# Patient Record
Sex: FEMALE | Race: WHITE | NOT HISPANIC OR LATINO | Employment: OTHER | URBAN - METROPOLITAN AREA
[De-identification: names, ages, dates, MRNs, and addresses within clinical notes are randomized per-mention and may not be internally consistent; named-entity substitution may affect disease eponyms.]

---

## 2017-04-24 ENCOUNTER — HOSPITAL ENCOUNTER (OUTPATIENT)
Dept: OTHER | Facility: HOSPITAL | Age: 77
Discharge: HOME | End: 2017-04-24
Attending: NURSE PRACTITIONER | Admitting: NURSE PRACTITIONER

## 2018-04-27 ENCOUNTER — HOSPITAL ENCOUNTER (OUTPATIENT)
Dept: GENERAL RADIOLOGY | Facility: HOSPITAL | Age: 78
Discharge: HOME | End: 2018-04-27
Attending: NURSE PRACTITIONER | Admitting: NURSE PRACTITIONER

## 2018-05-15 ENCOUNTER — HOSPITAL ENCOUNTER (OUTPATIENT)
Dept: GENERAL RADIOLOGY | Facility: HOSPITAL | Age: 78
Discharge: HOME | End: 2018-05-15
Attending: NURSE PRACTITIONER | Admitting: NURSE PRACTITIONER

## 2019-05-29 ENCOUNTER — HOSPITAL ENCOUNTER (OUTPATIENT)
Dept: OTHER | Facility: HOSPITAL | Age: 79
Discharge: HOME | End: 2019-05-29
Attending: NURSE PRACTITIONER | Admitting: NURSE PRACTITIONER

## 2019-05-29 ENCOUNTER — CONVERSION ENCOUNTER (OUTPATIENT)
Dept: FAMILY MEDICINE CLINIC | Facility: CLINIC | Age: 79
End: 2019-05-29

## 2019-05-29 LAB
ALBUMIN SERPL-MCNC: 4.1 G/DL (ref 3.6–5.1)
ALBUMIN/GLOB SERPL: ABNORMAL {RATIO} (ref 1–2.5)
ALP SERPL-CCNC: 59 UNITS/L (ref 33–130)
ALT SERPL-CCNC: 15 UNITS/L (ref 6–29)
AST SERPL-CCNC: 22 UNITS/L (ref 10–35)
BASOPHILS # BLD AUTO: ABNORMAL 10*3/MM3 (ref 0–200)
BASOPHILS NFR BLD AUTO: 0.9 %
BILIRUB SERPL-MCNC: 0.5 MG/DL (ref 0.2–1.2)
BUN SERPL-MCNC: 19 MG/DL (ref 7–25)
BUN/CREAT SERPL: ABNORMAL (ref 6–22)
CALCIUM SERPL-MCNC: 10.2 MG/DL (ref 8.6–10.4)
CHLORIDE SERPL-SCNC: 103 MMOL/L (ref 98–110)
CO2 CONTENT VENOUS: 30 MMOL/L (ref 20–32)
CONV NEUTROPHILS/100 LEUKOCYTES IN BODY FLUID BY MANUAL COUNT: 58.9 %
CONV TOTAL PROTEIN: 6.6 G/DL (ref 6.1–8.1)
CREAT UR-MCNC: 1.04 MG/DL (ref 0.6–0.88)
EOSINOPHIL # BLD AUTO: 3.7 %
EOSINOPHIL # BLD AUTO: ABNORMAL 10*3/MM3 (ref 15–500)
ERYTHROCYTE [DISTWIDTH] IN BLOOD BY AUTOMATED COUNT: 13.3 % (ref 11–15)
FERRITIN SERPL-MCNC: 93 NG/ML (ref 20–288)
GLOBULIN UR ELPH-MCNC: ABNORMAL G/DL (ref 1.9–3.7)
GLUCOSE SERPL-MCNC: 87 MG/DL (ref 65–99)
HCT VFR BLD AUTO: 45.8 % (ref 35–45)
HGB BLD-MCNC: 15.7 G/DL (ref 11.7–15.5)
IRON SATN MFR SERPL: ABNORMAL % (ref 11–50)
IRON SERPL-MCNC: 136 MCG/DL (ref 45–160)
LYMPHOCYTES # BLD AUTO: ABNORMAL 10*3/MM3 (ref 850–3900)
LYMPHOCYTES NFR BLD AUTO: 28.2 %
MCH RBC QN AUTO: 31.3 PG (ref 27–33)
MCHC RBC AUTO-ENTMCNC: ABNORMAL % (ref 32–36)
MCV RBC AUTO: 91.2 FL (ref 80–100)
MONOCYTES # BLD AUTO: ABNORMAL 10*3/MICROLITER (ref 200–950)
MONOCYTES NFR BLD AUTO: 8.3 %
NEUTROPHILS # BLD AUTO: ABNORMAL 10*3/MM3 (ref 1500–7800)
PLATELET # BLD AUTO: ABNORMAL 10*3/MM3 (ref 140–400)
PMV BLD AUTO: 9.3 FL (ref 7.5–12.5)
POTASSIUM SERPL-SCNC: 4.3 MMOL/L (ref 3.5–5.3)
RBC # BLD AUTO: ABNORMAL 10*6/MM3 (ref 3.8–5.1)
SODIUM SERPL-SCNC: 141 MMOL/L (ref 135–146)
TIBC SERPL-MCNC: ABNORMAL MCG/DL (ref 250–450)
WBC # BLD AUTO: ABNORMAL K/UL (ref 3.8–10.8)

## 2019-06-04 VITALS
WEIGHT: 147 LBS | OXYGEN SATURATION: 94 % | BODY MASS INDEX: 23.63 KG/M2 | RESPIRATION RATE: 16 BRPM | HEIGHT: 66 IN | HEART RATE: 71 BPM | DIASTOLIC BLOOD PRESSURE: 82 MMHG | SYSTOLIC BLOOD PRESSURE: 127 MMHG

## 2019-11-20 ENCOUNTER — OFFICE VISIT (OUTPATIENT)
Dept: FAMILY MEDICINE CLINIC | Facility: CLINIC | Age: 79
End: 2019-11-20

## 2019-11-20 VITALS
DIASTOLIC BLOOD PRESSURE: 77 MMHG | RESPIRATION RATE: 16 BRPM | HEIGHT: 63 IN | OXYGEN SATURATION: 97 % | WEIGHT: 144 LBS | HEART RATE: 70 BPM | TEMPERATURE: 97.7 F | BODY MASS INDEX: 25.52 KG/M2 | SYSTOLIC BLOOD PRESSURE: 129 MMHG

## 2019-11-20 DIAGNOSIS — M76.02 GLUTEAL TENDINITIS OF LEFT BUTTOCK: Primary | ICD-10-CM

## 2019-11-20 DIAGNOSIS — K21.9 GASTROESOPHAGEAL REFLUX DISEASE WITHOUT ESOPHAGITIS: ICD-10-CM

## 2019-11-20 DIAGNOSIS — N18.30 CHRONIC KIDNEY DISEASE, STAGE III (MODERATE) (HCC): ICD-10-CM

## 2019-11-20 PROBLEM — M85.89 OTHER SPECIFIED DISORDERS OF BONE DENSITY AND STRUCTURE, MULTIPLE SITES: Status: ACTIVE | Noted: 2018-05-03

## 2019-11-20 PROBLEM — D75.1 POLYCYTHEMIA: Status: ACTIVE | Noted: 2018-05-03

## 2019-11-20 PROBLEM — R31.9 HEMATURIA, UNSPECIFIED: Status: ACTIVE | Noted: 2017-03-28

## 2019-11-20 PROCEDURE — 99213 OFFICE O/P EST LOW 20 MIN: CPT | Performed by: NURSE PRACTITIONER

## 2019-11-20 RX ORDER — LORATADINE 10 MG/1
TABLET ORAL EVERY 24 HOURS
COMMUNITY
Start: 2017-03-28 | End: 2020-07-02

## 2019-11-20 RX ORDER — RANITIDINE 150 MG/1
150 TABLET ORAL 2 TIMES DAILY
Refills: 3 | COMMUNITY
Start: 2019-10-11 | End: 2019-11-21

## 2019-11-20 RX ORDER — RISEDRONATE SODIUM 150 MG/1
TABLET, FILM COATED ORAL
Refills: 3 | COMMUNITY
Start: 2019-10-14 | End: 2020-07-02 | Stop reason: SDUPTHER

## 2019-11-20 RX ORDER — CHLORAL HYDRATE 500 MG
CAPSULE ORAL
COMMUNITY
Start: 2016-02-25 | End: 2022-08-16

## 2019-11-20 RX ORDER — MELOXICAM 15 MG/1
15 TABLET ORAL DAILY
Qty: 14 TABLET | Refills: 0 | Status: SHIPPED | OUTPATIENT
Start: 2019-11-20 | End: 2020-07-02

## 2019-11-20 NOTE — PROGRESS NOTES
Chief Complaint   Patient presents with   • Hip Pain     Left   • Heartburn      Subjective     Elizabethperlita Evans  has a past medical history of Allergic rhinitis, Chronic kidney disease (CKD), stage III (moderate) (CMS/HCC), Emphysema lung (CMS/HCC), GERD (gastroesophageal reflux disease), Hematuria, Hemochromatosis, Hiatal hernia, Hyperlipidemia, IBS (irritable bowel syndrome), Mitral regurgitation, Multiple nodules of lung, Osteopenia, Paresthesia of both hands, Polycythemia, Recurrent isolated sleep paralysis, Rosacea, Seasonal affective disorder (CMS/HCC), and Sleep apnea.    Hip Pain    The incident occurred more than 1 week ago. There was no injury mechanism. The pain is present in the left hip. The quality of the pain is described as aching. The pain is at a severity of 2/10. The pain is mild. The pain has been fluctuating since onset. Pertinent negatives include no loss of motion, numbness or tingling. She reports no foreign bodies present. The symptoms are aggravated by movement (getting up and down from chair, stepping up/down steps). She has tried nothing for the symptoms.   Heartburn   She reports no abdominal pain, no chest pain, no choking, no coughing, no dysphagia, no heartburn, no nausea, no sore throat, no water brash or no wheezing. This is a chronic problem. The current episode started more than 1 year ago. The problem occurs rarely. The problem has been gradually improving. Pertinent negatives include no fatigue. She has tried a PPI and a histamine-2 antagonist for the symptoms. The treatment provided significant relief.   She has heard about recalls on Zantac and would like to discuss other treatment options.     PHQ-2 Depression Screening  Little interest or pleasure in doing things? 0   Feeling down, depressed, or hopeless? 0   PHQ-2 Total Score 0       No Known Allergies      Current Outpatient Medications:   •  loratadine (CLARITIN) 10 MG tablet, Daily., Disp: , Rfl:   •  Omega-3 Fatty  Acids (FISH OIL) 1000 MG capsule capsule, FISH OIL 1000 MG CAPS, Disp: , Rfl:   •  risedronate (ACTONEL) 150 MG tablet, TAKE 1 TABLET ONCE PER MONTH *TAKE IN THE MORNING WITH 8 OUNCES OF WATER 30 MINUTES BEFORE FOOD, Disp: , Rfl: 3  •  famotidine (PEPCID) 20 MG tablet, Take 1 tablet by mouth 2 (Two) Times a Day., Disp: , Rfl:   •  meloxicam (MOBIC) 15 MG tablet, Take 1 tablet by mouth Daily., Disp: 14 tablet, Rfl: 0    Past Medical History:   Diagnosis Date   • Allergic rhinitis    • Chronic kidney disease (CKD), stage III (moderate) (CMS/HCC)    • Emphysema lung (CMS/HCC)    • GERD (gastroesophageal reflux disease)    • Hematuria    • Hemochromatosis    • Hiatal hernia    • Hyperlipidemia    • IBS (irritable bowel syndrome)    • Mitral regurgitation    • Multiple nodules of lung    • Osteopenia    • Paresthesia of both hands    • Polycythemia    • Recurrent isolated sleep paralysis    • Rosacea    • Seasonal affective disorder (CMS/HCC)    • Sleep apnea      Past Surgical History:   Procedure Laterality Date   • BREAST BIOPSY Right     Benign   • CATARACT EXTRACTION       Social History     Socioeconomic History   • Marital status:      Spouse name: Not on file   • Number of children: Not on file   • Years of education: Not on file   • Highest education level: Not on file   Tobacco Use   • Smoking status: Never Smoker   • Smokeless tobacco: Never Used   Substance and Sexual Activity   • Alcohol use: No     Frequency: Never   • Drug use: No       Family History   Problem Relation Age of Onset   • Heart disease Mother    • Diabetes Mother    • Heart disease Father        Family history, surgical history, past medical history, Allergies and med's reviewed with patient today and updated in [facility] EMR.     ROS:  Review of Systems   Constitutional: Negative for activity change, appetite change, chills, diaphoresis, fatigue and fever.   HENT: Negative for sore throat, trouble swallowing and voice change.    Eyes:  "Negative for redness.   Respiratory: Negative for cough, choking, chest tightness, shortness of breath and wheezing.    Cardiovascular: Negative for chest pain and palpitations.   Gastrointestinal: Negative for abdominal pain, diarrhea, dysphagia, nausea and vomiting.   Musculoskeletal: Negative for back pain.   Skin: Negative for rash.   Neurological: Negative for tingling, weakness and numbness.       OBJECTIVE:  Vitals:    11/20/19 1002   BP: 129/77   BP Location: Left arm   Patient Position: Sitting   Cuff Size: Adult   Pulse: 70   Resp: 16   Temp: 97.7 °F (36.5 °C)   TempSrc: Oral   SpO2: 97%   Weight: 65.3 kg (144 lb)   Height: 160 cm (63\")     Body mass index is 25.51 kg/m².    Physical Exam   Constitutional: She is oriented to person, place, and time. She appears well-developed and well-nourished.   Neck: Trachea normal and normal range of motion. Neck supple. Carotid bruit is not present. No thyromegaly present.   Cardiovascular: Normal rate, regular rhythm, normal heart sounds and intact distal pulses. Exam reveals no gallop and no friction rub.   No murmur heard.  Pulmonary/Chest: Effort normal and breath sounds normal. She has no wheezes. She has no rales.   Abdominal: Soft. Bowel sounds are normal. There is no hepatosplenomegaly. No hernia.   Musculoskeletal: Normal range of motion. She exhibits no edema.        Left hip: She exhibits tenderness. She exhibits normal range of motion, normal strength, no swelling, no crepitus and no deformity.   Mild tenderness at lateral aspect of the left hip and along left gluteus muscle.    Lymphadenopathy:     She has no cervical adenopathy.   Neurological: She is alert and oriented to person, place, and time.   Skin: Skin is warm and dry.   Psychiatric: She has a normal mood and affect. Her behavior is normal. Judgment and thought content normal.       No visits with results within 30 Day(s) from this visit.   Latest known visit with results is:   Conversion " Encounter on 05/29/2019   Component Date Value Ref Range Status   • Albumin 05/29/2019 4.1  3.6 - 5.1 g/dL Final   • Alkaline Phosphatase 05/29/2019 59  33 - 130 units/L Final   • Basophils Absolute 05/29/2019 63 CELLS/UL  0 - 200 10*3/mm3 Final   • Basophil Rel % 05/29/2019 0.9  % Final   • Total Bilirubin 05/29/2019 0.5  0.2 - 1.2 mg/dL Final   • BUN 05/29/2019 19  7 - 25 mg/dL Final   • BUN/Creatinine Ratio 05/29/2019 18 (calc)  6 - 22 Final   • Calcium 05/29/2019 10.2  8.6 - 10.4 mg/dL Final   • Chloride 05/29/2019 103  98 - 110 mmol/L Final   • CO2 CONTENT  05/29/2019 30  20 - 32 mmol/L Final   • Creatinine 05/29/2019 1.04* 0.60 - 0.88 mg/dL Final   • eGFR African Am 05/29/2019 51* > OR = 60 mL/min/1.73m2 Final   • eGFR Non African Am 05/29/2019 59* > OR = 60 mL/min/1.73m2 Final   • Eosinophils Absolute 05/29/2019 259 CELLS/UL  15 - 500 10*3/mm3 Final   • Eosinophil Rel % 05/29/2019 3.7  % Final   • Ferritin 05/29/2019 93  20 - 288 ng/mL Final   • Globulin 05/29/2019 2.5 G/DL (CALC)  1.9 - 3.7 g/dL Final   • Glucose 05/29/2019 87  65 - 99 mg/dL Final   • Hematocrit 05/29/2019 45.8* 35.0 - 45.0 % Final   • Hemoglobin 05/29/2019 15.7* 11.7 - 15.5 g/dL Final   • Iron 05/29/2019 136  45 - 160 mcg/dL Final   • Iron Saturation 05/29/2019 44 % (CALC)  11 - 50 % Final   • TIBC 05/29/2019 306 MCG/DL (CALC)  250 - 450 mcg/dL Final   • Lymphocytes Absolute 05/29/2019 1974 CELLS/UL  850 - 3900 10*3/mm3 Final   • Lymphocyte Rel % 05/29/2019 28.2  % Final   • MCH 05/29/2019 31.3  27.0 - 33.0 pg Final   • MCHC 05/29/2019 34.3 G/DL  32.0 - 36.0 % Final   • MCV 05/29/2019 91.2  80.0 - 100.0 fL Final   • Monocyte Rel % 05/29/2019 8.3  % Final   • Monocytes Absolute 05/29/2019 581 CELLS/UL  200 - 950 10*3/microliter Final   • MPV 05/29/2019 9.3  7.5 - 12.5 fL Final   • Neutrophils Absolute 05/29/2019 4123 CELLS/UL  1500 - 7800 10*3/mm3 Final   • Platelets 05/29/2019 202 THOUSAND/UL  140 - 400 10*3/mm3 Final   • Neutrophils, Fluid  05/29/2019 58.9  % Final   • Potassium 05/29/2019 4.3  3.5 - 5.3 mmol/L Final   • Total Protein 05/29/2019 6.6  6.1 - 8.1 g/dL Final   • RBC 05/29/2019 5.02 MILLION/UL  3.80 - 5.10 10*6/mm3 Final   • RDW 05/29/2019 13.3  11.0 - 15.0 % Final   • AST (SGOT) 05/29/2019 22  10 - 35 units/L Final   • ALT (SGPT) 05/29/2019 15  6 - 29 units/L Final   • Sodium 05/29/2019 141  135 - 146 mmol/L Final   • WBC 05/29/2019 7.0 THOUSAND/UL  3.8 - 10.8 K/uL Final   • A/G Ratio 05/29/2019 1.6 (calc)  1.0 - 2.5 Final       ASSESSMENT/ PLAN:    Diagnoses and all orders for this visit:    1. Gluteal tendinitis of left buttock (Primary)  Comments:  Probable cause of her pain. Trial of NSAID.   f/up in 1 mo. If no better will get x-ray.     2. Chronic kidney disease, stage III (moderate) (CMS/HCC)  Assessment & Plan:  Discussed risks of taking NSAID. She is only going to take it for 2 weeks.     Orders:  -     CBC & Differential  -     Comprehensive Metabolic Panel    3. Gastroesophageal reflux disease without esophagitis  Assessment & Plan:  She is going to try switching to OTC Pepcid.   She will let me know if this is not effective.       Other orders  -     meloxicam (MOBIC) 15 MG tablet; Take 1 tablet by mouth Daily.  Dispense: 14 tablet; Refill: 0  -     famotidine (PEPCID) 20 MG tablet; Take 1 tablet by mouth 2 (Two) Times a Day.      Orders Placed Today:     New Medications Ordered This Visit   Medications   • meloxicam (MOBIC) 15 MG tablet     Sig: Take 1 tablet by mouth Daily.     Dispense:  14 tablet     Refill:  0   • famotidine (PEPCID) 20 MG tablet     Sig: Take 1 tablet by mouth 2 (Two) Times a Day.        Management Plan:     An After Visit Summary was printed and given to the patient at discharge.    Follow-up: Return in about 1 month (around 12/20/2019) for Recheck hip pain.    Beth Rosales, APRN 11/21/2019 3:57 PM  This note was electronically signed.

## 2019-11-20 NOTE — PATIENT INSTRUCTIONS
Pepcid (Famotidine) tablets or gelcaps  What is this medicine?  FAMOTIDINE (fa AFTAB ti pawan) is a type of antihistamine that blocks the release of stomach acid. It is used to treat stomach or intestinal ulcers. It can also relieve heartburn from acid reflux.  This medicine may be used for other purposes; ask your health care provider or pharmacist if you have questions.  COMMON BRAND NAME(S): Heartburn Relief, Pepcid, Pepcid AC, Pepcid AC Maximum Strength  What should I tell my health care provider before I take this medicine?  They need to know if you have any of these conditions:  -kidney or liver disease  -trouble swallowing  -an unusual or allergic reaction to famotidine, other medicines, foods, dyes, or preservatives  -pregnant or trying to get pregnant  -breast-feeding  How should I use this medicine?  Take this medicine by mouth with a glass of water. Follow the directions on the prescription label. If you only take this medicine once a day, take it at bedtime. Take your doses at regular intervals. Do not take your medicine more often than directed.  Talk to your pediatrician regarding the use of this medicine in children. Special care may be needed.  Overdosage: If you think you have taken too much of this medicine contact a poison control center or emergency room at once.  NOTE: This medicine is only for you. Do not share this medicine with others.  What if I miss a dose?  If you miss a dose, take it as soon as you can. If it is almost time for your next dose, take only that dose. Do not take double or extra doses.  What may interact with this medicine?  -delavirdine  -itraconazole  -ketoconazole  This list may not describe all possible interactions. Give your health care provider a list of all the medicines, herbs, non-prescription drugs, or dietary supplements you use. Also tell them if you smoke, drink alcohol, or use illegal drugs. Some items may interact with your medicine.  What should I watch for  while using this medicine?  Tell your doctor or health care professional if your condition does not start to get better or if it gets worse. Finish the full course of tablets prescribed, even if you feel better.  Do not take with aspirin, ibuprofen or other antiinflammatory medicines. These can make your condition worse.  Do not smoke cigarettes or drink alcohol. These cause irritation in your stomach and can increase the time it will take for ulcers to heal.  If you get black, tarry stools or vomit up what looks like coffee grounds, call your doctor or health care professional at once. You may have a bleeding ulcer.  This medicine may cause a decrease in vitamin B12. You should make sure that you get enough vitamin B12 while you are taking this medicine. Discuss the foods you eat and the vitamins you take with your health care professional.  What side effects may I notice from receiving this medicine?  Side effects that you should report to your doctor or health care professional as soon as possible:  -agitation, nervousness  -confusion  -hallucinations  -skin rash, itching  Side effects that usually do not require medical attention (report to your doctor or health care professional if they continue or are bothersome):  -constipation  -diarrhea  -dizziness  -headache  This list may not describe all possible side effects. Call your doctor for medical advice about side effects. You may report side effects to FDA at 1-809-FDA-6769.  Where should I keep my medicine?  Keep out of the reach of children.  Store at room temperature between 15 and 30 degrees C (59 and 86 degrees F). Do not freeze. Throw away any unused medicine after the expiration date.  NOTE: This sheet is a summary. It may not cover all possible information. If you have questions about this medicine, talk to your doctor, pharmacist, or health care provider.  © 2019 [employer]/Gold Standard (2018-08-03 13:17:50)

## 2019-11-21 RX ORDER — FAMOTIDINE 20 MG/1
20 TABLET, FILM COATED ORAL 2 TIMES DAILY
Start: 2019-11-21 | End: 2021-09-09

## 2019-11-23 LAB
ALBUMIN SERPL-MCNC: 4.2 G/DL (ref 3.5–4.7)
ALBUMIN/GLOB SERPL: 1.8 {RATIO} (ref 1.2–2.2)
ALP SERPL-CCNC: 58 IU/L (ref 39–117)
ALT SERPL-CCNC: 15 IU/L (ref 0–32)
AST SERPL-CCNC: 25 IU/L (ref 0–40)
BASOPHILS # BLD AUTO: 0.1 X10E3/UL (ref 0–0.2)
BASOPHILS NFR BLD AUTO: 1 %
BILIRUB SERPL-MCNC: 0.4 MG/DL (ref 0–1.2)
BUN SERPL-MCNC: 12 MG/DL (ref 8–27)
BUN/CREAT SERPL: 13 (ref 12–28)
CALCIUM SERPL-MCNC: 9.3 MG/DL (ref 8.7–10.3)
CHLORIDE SERPL-SCNC: 104 MMOL/L (ref 96–106)
CO2 SERPL-SCNC: 25 MMOL/L (ref 20–29)
CREAT SERPL-MCNC: 0.93 MG/DL (ref 0.57–1)
EOSINOPHIL # BLD AUTO: 0.3 X10E3/UL (ref 0–0.4)
EOSINOPHIL NFR BLD AUTO: 6 %
ERYTHROCYTE [DISTWIDTH] IN BLOOD BY AUTOMATED COUNT: 13.8 % (ref 12.3–15.4)
GLOBULIN SER CALC-MCNC: 2.4 G/DL (ref 1.5–4.5)
GLUCOSE SERPL-MCNC: 86 MG/DL (ref 65–99)
HCT VFR BLD AUTO: 46.3 % (ref 34–46.6)
HGB BLD-MCNC: 15.3 G/DL (ref 11.1–15.9)
IMM GRANULOCYTES # BLD AUTO: 0 X10E3/UL (ref 0–0.1)
IMM GRANULOCYTES NFR BLD AUTO: 0 %
LYMPHOCYTES # BLD AUTO: 1.9 X10E3/UL (ref 0.7–3.1)
LYMPHOCYTES NFR BLD AUTO: 34 %
MCH RBC QN AUTO: 30.8 PG (ref 26.6–33)
MCHC RBC AUTO-ENTMCNC: 33 G/DL (ref 31.5–35.7)
MCV RBC AUTO: 93 FL (ref 79–97)
MONOCYTES # BLD AUTO: 0.5 X10E3/UL (ref 0.1–0.9)
MONOCYTES NFR BLD AUTO: 9 %
NEUTROPHILS # BLD AUTO: 2.8 X10E3/UL (ref 1.4–7)
NEUTROPHILS NFR BLD AUTO: 50 %
PLATELET # BLD AUTO: 215 X10E3/UL (ref 150–450)
POTASSIUM SERPL-SCNC: 4.7 MMOL/L (ref 3.5–5.2)
PROT SERPL-MCNC: 6.6 G/DL (ref 6–8.5)
RBC # BLD AUTO: 4.96 X10E6/UL (ref 3.77–5.28)
SODIUM SERPL-SCNC: 143 MMOL/L (ref 134–144)
WBC # BLD AUTO: 5.5 X10E3/UL (ref 3.4–10.8)

## 2019-12-26 ENCOUNTER — OFFICE VISIT (OUTPATIENT)
Dept: FAMILY MEDICINE CLINIC | Facility: CLINIC | Age: 79
End: 2019-12-26

## 2019-12-26 VITALS
RESPIRATION RATE: 18 BRPM | WEIGHT: 145.4 LBS | HEART RATE: 87 BPM | HEIGHT: 63 IN | SYSTOLIC BLOOD PRESSURE: 125 MMHG | BODY MASS INDEX: 25.76 KG/M2 | TEMPERATURE: 97.8 F | DIASTOLIC BLOOD PRESSURE: 68 MMHG | OXYGEN SATURATION: 96 %

## 2019-12-26 DIAGNOSIS — N18.30 CHRONIC KIDNEY DISEASE, STAGE III (MODERATE) (HCC): ICD-10-CM

## 2019-12-26 DIAGNOSIS — K21.9 GASTROESOPHAGEAL REFLUX DISEASE WITHOUT ESOPHAGITIS: ICD-10-CM

## 2019-12-26 DIAGNOSIS — M76.02 GLUTEAL TENDINITIS OF LEFT BUTTOCK: Primary | ICD-10-CM

## 2019-12-26 PROCEDURE — 99213 OFFICE O/P EST LOW 20 MIN: CPT | Performed by: NURSE PRACTITIONER

## 2019-12-26 NOTE — PROGRESS NOTES
Chief Complaint   Patient presents with   • Hip Pain     f/u left hip, better        Subjective     Elizabethperlita Evans  has a past medical history of Allergic rhinitis, Chronic kidney disease (CKD), stage III (moderate) (CMS/HCC), Emphysema lung (CMS/HCC), GERD (gastroesophageal reflux disease), Hematuria, Hemochromatosis, Hiatal hernia, Hyperlipidemia, IBS (irritable bowel syndrome), Mitral regurgitation, Multiple nodules of lung, Osteopenia, Paresthesia of both hands, Polycythemia, Recurrent isolated sleep paralysis, Rosacea, Seasonal affective disorder (CMS/HCC), and Sleep apnea.    Hip Pain    The incident occurred more than 1 week ago. There was no injury mechanism. The pain is present in the left hip. The quality of the pain is described as aching. The patient is experiencing no pain. The pain has been improving since onset. Pertinent negatives include no loss of motion, numbness or tingling. She reports no foreign bodies present. The symptoms are aggravated by movement (getting up and down from chair, stepping up/down steps). She has tried NSAIDs for the symptoms. The treatment provided significant relief.   Heartburn   She reports no abdominal pain, no chest pain, no choking, no coughing, no dysphagia, no heartburn, no nausea, no sore throat, no water brash or no wheezing. This is a chronic problem. The current episode started more than 1 year ago. The problem occurs rarely. The problem has been gradually improving. Pertinent negatives include no fatigue. She has tried a PPI and a histamine-2 antagonist for the symptoms. The treatment provided significant relief.   Chronic Kidney Disease   This is a new problem. The current episode started more than 1 month ago. Pertinent negatives include no abdominal pain, chest pain, chills, coughing, diaphoresis, fatigue, fever, nausea, numbness, rash, sore throat, vomiting or weakness. Nothing aggravates the symptoms.   She used Mobic sparingly and her hip pain has  resolved. She has been taking OTC Pepcid and it is helping her reflux symptoms.     No Known Allergies      Current Outpatient Medications:   •  famotidine (PEPCID) 20 MG tablet, Take 1 tablet by mouth 2 (Two) Times a Day., Disp: , Rfl:   •  loratadine (CLARITIN) 10 MG tablet, Daily., Disp: , Rfl:   •  meloxicam (MOBIC) 15 MG tablet, Take 1 tablet by mouth Daily., Disp: 14 tablet, Rfl: 0  •  Omega-3 Fatty Acids (FISH OIL) 1000 MG capsule capsule, FISH OIL 1000 MG CAPS, Disp: , Rfl:   •  risedronate (ACTONEL) 150 MG tablet, TAKE 1 TABLET ONCE PER MONTH *TAKE IN THE MORNING WITH 8 OUNCES OF WATER 30 MINUTES BEFORE FOOD, Disp: , Rfl: 3    Past Medical History:   Diagnosis Date   • Allergic rhinitis    • Chronic kidney disease (CKD), stage III (moderate) (CMS/HCC)    • Emphysema lung (CMS/HCC)    • GERD (gastroesophageal reflux disease)    • Hematuria    • Hemochromatosis    • Hiatal hernia    • Hyperlipidemia    • IBS (irritable bowel syndrome)    • Mitral regurgitation    • Multiple nodules of lung    • Osteopenia    • Paresthesia of both hands    • Polycythemia    • Recurrent isolated sleep paralysis    • Rosacea    • Seasonal affective disorder (CMS/HCC)    • Sleep apnea        Past Surgical History:   Procedure Laterality Date   • BREAST BIOPSY Right     Benign   • CATARACT EXTRACTION         Social History     Socioeconomic History   • Marital status:      Spouse name: Not on file   • Number of children: Not on file   • Years of education: Not on file   • Highest education level: Not on file   Tobacco Use   • Smoking status: Never Smoker   • Smokeless tobacco: Never Used   Substance and Sexual Activity   • Alcohol use: No     Frequency: Never   • Drug use: No       Family History   Problem Relation Age of Onset   • Heart disease Mother    • Diabetes Mother    • Heart disease Father        Family history, surgical history, past medical history, Allergies and med's reviewed with patient today and updated in  "[facility] EMR.     ROS:  Review of Systems   Constitutional: Negative for activity change, appetite change, chills, diaphoresis, fatigue and fever.   HENT: Negative for sore throat, trouble swallowing and voice change.    Eyes: Negative for redness.   Respiratory: Negative for cough, choking, chest tightness, shortness of breath and wheezing.    Cardiovascular: Negative for chest pain and palpitations.   Gastrointestinal: Negative for abdominal pain, diarrhea, dysphagia, nausea and vomiting.   Musculoskeletal: Negative for back pain.   Skin: Negative for rash.   Neurological: Negative for tingling, weakness and numbness.       OBJECTIVE:  Vitals:    12/26/19 1309   BP: 125/68   BP Location: Left arm   Patient Position: Sitting   Cuff Size: Adult   Pulse: 87   Resp: 18   Temp: 97.8 °F (36.6 °C)   TempSrc: Oral   SpO2: 96%   Weight: 66 kg (145 lb 6.4 oz)   Height: 160 cm (63\")     Body mass index is 25.76 kg/m².    Physical Exam   Constitutional: She is oriented to person, place, and time. She appears well-developed and well-nourished.   Neck: Trachea normal and normal range of motion. Neck supple. Carotid bruit is not present. No thyromegaly present.   Cardiovascular: Normal rate, regular rhythm, normal heart sounds and intact distal pulses. Exam reveals no gallop and no friction rub.   No murmur heard.  Pulmonary/Chest: Effort normal and breath sounds normal. She has no wheezes. She has no rales.   Abdominal: Soft. Bowel sounds are normal. There is no hepatosplenomegaly. No hernia.   Musculoskeletal: Normal range of motion. She exhibits no edema.        Left hip: She exhibits normal range of motion, normal strength, no tenderness, no swelling, no crepitus and no deformity.   No tenderness at lateral aspect of the left hip and along left gluteus muscle.    Lymphadenopathy:     She has no cervical adenopathy.   Neurological: She is alert and oriented to person, place, and time.   Skin: Skin is warm and dry.   Psychiatric: " She has a normal mood and affect. Her behavior is normal. Judgment and thought content normal.       No visits with results within 30 Day(s) from this visit.   Latest known visit with results is:   Office Visit on 11/20/2019   Component Date Value Ref Range Status   • WBC 11/22/2019 5.5  3.4 - 10.8 x10E3/uL Final   • RBC 11/22/2019 4.96  3.77 - 5.28 x10E6/uL Final   • Hemoglobin 11/22/2019 15.3  11.1 - 15.9 g/dL Final   • Hematocrit 11/22/2019 46.3  34.0 - 46.6 % Final   • MCV 11/22/2019 93  79 - 97 fL Final   • MCH 11/22/2019 30.8  26.6 - 33.0 pg Final   • MCHC 11/22/2019 33.0  31.5 - 35.7 g/dL Final   • RDW 11/22/2019 13.8  12.3 - 15.4 % Final   • Platelets 11/22/2019 215  150 - 450 x10E3/uL Final   • Neutrophil Rel % 11/22/2019 50  Not Estab. % Final   • Lymphocyte Rel % 11/22/2019 34  Not Estab. % Final   • Monocyte Rel % 11/22/2019 9  Not Estab. % Final   • Eosinophil Rel % 11/22/2019 6  Not Estab. % Final   • Basophil Rel % 11/22/2019 1  Not Estab. % Final   • Neutrophils Absolute 11/22/2019 2.8  1.4 - 7.0 x10E3/uL Final   • Lymphocytes Absolute 11/22/2019 1.9  0.7 - 3.1 x10E3/uL Final   • Monocytes Absolute 11/22/2019 0.5  0.1 - 0.9 x10E3/uL Final   • Eosinophils Absolute 11/22/2019 0.3  0.0 - 0.4 x10E3/uL Final   • Basophils Absolute 11/22/2019 0.1  0.0 - 0.2 x10E3/uL Final   • Immature Granulocyte Rel % 11/22/2019 0  Not Estab. % Final   • Immature Grans Absolute 11/22/2019 0.0  0.0 - 0.1 x10E3/uL Final   • Glucose 11/22/2019 86  65 - 99 mg/dL Final   • BUN 11/22/2019 12  8 - 27 mg/dL Final   • Creatinine 11/22/2019 0.93  0.57 - 1.00 mg/dL Final   • eGFR Non African Am 11/22/2019 58* >59 mL/min/1.73 Final   • eGFR African Am 11/22/2019 67  >59 mL/min/1.73 Final   • BUN/Creatinine Ratio 11/22/2019 13  12 - 28 Final   • Sodium 11/22/2019 143  134 - 144 mmol/L Final   • Potassium 11/22/2019 4.7  3.5 - 5.2 mmol/L Final   • Chloride 11/22/2019 104  96 - 106 mmol/L Final   • Total CO2 11/22/2019 25  20 - 29  mmol/L Final   • Calcium 11/22/2019 9.3  8.7 - 10.3 mg/dL Final   • Total Protein 11/22/2019 6.6  6.0 - 8.5 g/dL Final   • Albumin 11/22/2019 4.2  3.5 - 4.7 g/dL Final   • Globulin 11/22/2019 2.4  1.5 - 4.5 g/dL Final   • A/G Ratio 11/22/2019 1.8  1.2 - 2.2 Final   • Total Bilirubin 11/22/2019 0.4  0.0 - 1.2 mg/dL Final   • Alkaline Phosphatase 11/22/2019 58  39 - 117 IU/L Final   • AST (SGOT) 11/22/2019 25  0 - 40 IU/L Final   • ALT (SGPT) 11/22/2019 15  0 - 32 IU/L Final       ASSESSMENT/ PLAN:    Diagnoses and all orders for this visit:    1. Gluteal tendinitis of left buttock (Primary)  Comments:  Pain has resolved.  Try to avoid use of Mobic, if used use very sparingly.    2. Chronic kidney disease, stage III (moderate) (CMS/HCC)  Assessment & Plan:  Reviewed recent lab results.  Stay well-hydrated.  Avoid NSAIDs as much as possible.  Again reiterated to use Mobic very sparingly.      3. Gastroesophageal reflux disease without esophagitis  Assessment & Plan:  Doing well on OTC Pepcid.        Orders Placed Today:     No orders of the defined types were placed in this encounter.       Management Plan:     An After Visit Summary was printed and given to the patient at discharge.    Follow-up: Return in about 5 months (around 6/3/2020) for Medicare Wellness.    JESSICA Hall 12/27/2019 1:21 PM  This note was electronically signed.

## 2019-12-27 NOTE — ASSESSMENT & PLAN NOTE
Reviewed recent lab results.  Stay well-hydrated.  Avoid NSAIDs as much as possible.  Again reiterated to use Mobic very sparingly.

## 2020-03-30 ENCOUNTER — TELEPHONE (OUTPATIENT)
Dept: FAMILY MEDICINE CLINIC | Facility: CLINIC | Age: 80
End: 2020-03-30

## 2020-03-30 PROBLEM — R93.3 ABNORMAL CT SCAN, ESOPHAGUS: Status: ACTIVE | Noted: 2020-03-30

## 2020-05-04 ENCOUNTER — TELEPHONE (OUTPATIENT)
Dept: FAMILY MEDICINE CLINIC | Facility: CLINIC | Age: 80
End: 2020-05-04

## 2020-05-04 DIAGNOSIS — Z12.31 BREAST CANCER SCREENING BY MAMMOGRAM: Primary | ICD-10-CM

## 2020-05-04 NOTE — TELEPHONE ENCOUNTER
Patient received a letter that she is due for her mammogram. Wants you to order so she can schedule.

## 2020-06-01 ENCOUNTER — HOSPITAL ENCOUNTER (OUTPATIENT)
Dept: MAMMOGRAPHY | Facility: HOSPITAL | Age: 80
Discharge: HOME | End: 2020-06-01
Admitting: NURSE PRACTITIONER

## 2020-06-01 DIAGNOSIS — Z12.31 BREAST CANCER SCREENING BY MAMMOGRAM: ICD-10-CM

## 2020-06-01 PROCEDURE — 77063 BREAST TOMOSYNTHESIS BI: CPT

## 2020-06-01 PROCEDURE — 77067 SCR MAMMO BI INCL CAD: CPT

## 2020-07-02 ENCOUNTER — OFFICE VISIT (OUTPATIENT)
Dept: FAMILY MEDICINE CLINIC | Facility: CLINIC | Age: 80
End: 2020-07-02

## 2020-07-02 VITALS
WEIGHT: 143 LBS | BODY MASS INDEX: 25.34 KG/M2 | SYSTOLIC BLOOD PRESSURE: 122 MMHG | OXYGEN SATURATION: 94 % | HEIGHT: 63 IN | RESPIRATION RATE: 16 BRPM | HEART RATE: 80 BPM | TEMPERATURE: 97.3 F | DIASTOLIC BLOOD PRESSURE: 73 MMHG

## 2020-07-02 DIAGNOSIS — E83.119 HEMOCHROMATOSIS, UNSPECIFIED HEMOCHROMATOSIS TYPE: ICD-10-CM

## 2020-07-02 DIAGNOSIS — G47.53 RECURRENT ISOLATED SLEEP PARALYSIS: ICD-10-CM

## 2020-07-02 DIAGNOSIS — H61.23 BILATERAL IMPACTED CERUMEN: ICD-10-CM

## 2020-07-02 DIAGNOSIS — Z12.11 SCREENING FOR COLON CANCER: ICD-10-CM

## 2020-07-02 DIAGNOSIS — N18.30 CHRONIC KIDNEY DISEASE, STAGE III (MODERATE) (HCC): ICD-10-CM

## 2020-07-02 DIAGNOSIS — B00.9 HERPETIC LESIONS: ICD-10-CM

## 2020-07-02 DIAGNOSIS — G47.33 OBSTRUCTIVE SLEEP APNEA SYNDROME: ICD-10-CM

## 2020-07-02 DIAGNOSIS — N95.2 ATROPHIC VAGINITIS: ICD-10-CM

## 2020-07-02 DIAGNOSIS — N81.10 FEMALE CYSTOCELE: ICD-10-CM

## 2020-07-02 DIAGNOSIS — F33.8 SEASONAL AFFECTIVE DISORDER (HCC): ICD-10-CM

## 2020-07-02 DIAGNOSIS — E78.2 MIXED HYPERLIPIDEMIA: ICD-10-CM

## 2020-07-02 DIAGNOSIS — R91.8 MULTIPLE PULMONARY NODULES: ICD-10-CM

## 2020-07-02 DIAGNOSIS — K58.1 IRRITABLE BOWEL SYNDROME WITH CONSTIPATION: ICD-10-CM

## 2020-07-02 DIAGNOSIS — K44.9 HIATAL HERNIA: ICD-10-CM

## 2020-07-02 DIAGNOSIS — J43.9 PULMONARY EMPHYSEMA, UNSPECIFIED EMPHYSEMA TYPE (HCC): ICD-10-CM

## 2020-07-02 DIAGNOSIS — R31.21 ASYMPTOMATIC MICROSCOPIC HEMATURIA: ICD-10-CM

## 2020-07-02 DIAGNOSIS — M85.89 OSTEOPENIA OF MULTIPLE SITES: ICD-10-CM

## 2020-07-02 DIAGNOSIS — J30.9 ALLERGIC RHINITIS, UNSPECIFIED SEASONALITY, UNSPECIFIED TRIGGER: ICD-10-CM

## 2020-07-02 DIAGNOSIS — K22.9 ESOPHAGEAL ABNORMALITY: ICD-10-CM

## 2020-07-02 DIAGNOSIS — Z00.01 ENCOUNTER FOR GENERAL ADULT MEDICAL EXAMINATION WITH ABNORMAL FINDINGS: Primary | ICD-10-CM

## 2020-07-02 DIAGNOSIS — I34.0 MITRAL VALVE INSUFFICIENCY, UNSPECIFIED ETIOLOGY: ICD-10-CM

## 2020-07-02 DIAGNOSIS — K21.9 GASTROESOPHAGEAL REFLUX DISEASE WITHOUT ESOPHAGITIS: ICD-10-CM

## 2020-07-02 PROBLEM — H04.129 DRY EYE SYNDROME: Status: ACTIVE | Noted: 2020-07-02

## 2020-07-02 LAB
BILIRUB BLD-MCNC: NEGATIVE MG/DL
CLARITY, POC: CLEAR
COLOR UR: YELLOW
GLUCOSE UR STRIP-MCNC: NEGATIVE MG/DL
KETONES UR QL: NEGATIVE
LEUKOCYTE EST, POC: ABNORMAL
NITRITE UR-MCNC: NEGATIVE MG/ML
PH UR: 5.5 [PH] (ref 5–8)
PROT UR STRIP-MCNC: NEGATIVE MG/DL
RBC # UR STRIP: ABNORMAL /UL
SP GR UR: 1.02 (ref 1–1.03)
UROBILINOGEN UR QL: NORMAL

## 2020-07-02 PROCEDURE — 81003 URINALYSIS AUTO W/O SCOPE: CPT | Performed by: NURSE PRACTITIONER

## 2020-07-02 PROCEDURE — G0439 PPPS, SUBSEQ VISIT: HCPCS | Performed by: NURSE PRACTITIONER

## 2020-07-02 PROCEDURE — 99214 OFFICE O/P EST MOD 30 MIN: CPT | Performed by: NURSE PRACTITIONER

## 2020-07-02 RX ORDER — RISEDRONATE SODIUM 150 MG/1
150 TABLET, FILM COATED ORAL
Qty: 4 TABLET | Refills: 3 | Status: SHIPPED | OUTPATIENT
Start: 2020-07-02 | End: 2021-07-28 | Stop reason: SDUPTHER

## 2020-07-02 RX ORDER — DOCUSATE SODIUM 100 MG/1
CAPSULE, LIQUID FILLED ORAL
COMMUNITY
Start: 2020-03-29 | End: 2021-09-09

## 2020-07-02 RX ORDER — POLYETHYLENE GLYCOL 3350 17 G/17G
17 POWDER, FOR SOLUTION ORAL DAILY
COMMUNITY

## 2020-07-02 RX ORDER — CETIRIZINE HYDROCHLORIDE 10 MG/1
10 TABLET ORAL AS NEEDED
COMMUNITY

## 2020-07-02 RX ORDER — DICYCLOMINE HCL 20 MG
TABLET ORAL
COMMUNITY
Start: 2020-03-29 | End: 2020-07-02

## 2020-07-02 NOTE — PROGRESS NOTES
Medicare Annual Wellness Visit  Chief Complaint   Patient presents with   • Medicare Wellness-subsequent   • Hyperlipidemia   • Chronic Kidney Disease       Subjective     Elizabeth Evans is a 79 y.o. female who presents for an Annual Wellness Visit. In addition, we addressed the following health issues:    Hyperlipidemia   This is a chronic problem. The current episode started more than 1 year ago. The problem is uncontrolled. Recent lipid tests were reviewed and are high. Pertinent negatives include no chest pain, myalgias or shortness of breath. Current antihyperlipidemic treatment includes diet change and exercise.   Heartburn   She complains of heartburn. She reports no abdominal pain, no chest pain, no choking, no coughing, no dysphagia, no globus sensation, no nausea, no sore throat, no water brash or no wheezing. This is a chronic problem. The current episode started more than 1 year ago. The problem occurs frequently. The problem has been waxing and waning. The heartburn duration is several minutes. The heartburn is located in the substernum. The heartburn is of mild intensity. The heartburn does not wake her from sleep. The heartburn does not limit her activity. The heartburn doesn't change with position. Nothing aggravates the symptoms. Associated symptoms include fatigue. Pertinent negatives include no weight loss. She has tried a PPI and a histamine-2 antagonist (Nexium and Zantac were more effective than Pepcid) for the symptoms. The treatment provided mild relief.   Chronic Kidney Disease   This is a chronic problem. The current episode started more than 1 year ago. The problem occurs constantly. The problem has been unchanged. Associated symptoms include arthralgias and fatigue. Pertinent negatives include no abdominal pain, chest pain, chills, congestion, coughing, diaphoresis, fever, joint swelling, myalgias, nausea, neck pain, rash, sore throat, swollen glands, vomiting or weakness. Nothing  aggravates the symptoms.   Allergies  The patient also presents with allergies.  The onset of the problem began >1 year ago.   She denies nasal obstruction, nasal stuffiness, purulent nasal discharge, runny nose, post nasal drip, itchy watery eyes, sneezing and cough.  The patient notes it is exacerbated by seasonal changes.  The patient denies headache, facial pain, blocked ears, fatigue, urticaria, hoarseness, sinus infection and loss of smell.  Prior effective therapies include antihistamines.    Medications    Current Outpatient Medications:   •  cetirizine (zyrTEC) 10 MG tablet, Take 10 mg by mouth Daily., Disp: , Rfl:   •  docusate sodium (COLACE) 100 MG capsule, TAKE 1 CAPSULE BY MOUTH TWICE A DAY AS NEEDED FOR CONSTIPATION, Disp: , Rfl:   •  famotidine (PEPCID) 20 MG tablet, Take 1 tablet by mouth 2 (Two) Times a Day., Disp: , Rfl:   •  Omega-3 Fatty Acids (FISH OIL) 1000 MG capsule capsule, FISH OIL 1000 MG CAPS, Disp: , Rfl:   •  polyethylene glycol (MIRALAX) 17 GM/SCOOP powder, Take 17 g by mouth Daily., Disp: , Rfl:   •  risedronate (ACTONEL) 150 MG tablet, Take 1 tablet by mouth Every 30 (Thirty) Days. with water on empty stomach, nothing by mouth or lie down for next 30 minutes., Disp: 4 tablet, Rfl: 3     Problem List  Patient Active Problem List   Diagnosis   • Allergic rhinitis   • Chronic kidney disease, stage III (moderate) (CMS/HCC)   • Gastroesophageal reflux disease   • Hand paresthesia   • Hearing loss   • Hematuria, unspecified   • Hemochromatosis   • Hiatal hernia   • Hyperlipidemia   • Irritable bowel syndrome   • Mitral regurgitation   • Multiple pulmonary nodules   • Osteopenia of multiple sites   • Pulmonary emphysema (CMS/HCC)   • Recurrent isolated sleep paralysis   • Rosacea   • Seasonal affective disorder (CMS/HCC)   • Obstructive sleep apnea syndrome   • Abnormal CT scan, esophagus   • Herpetic lesions   • History of ehrlichiosis   • Atrophic vaginitis   • Dry eye syndrome   •  Female cystocele   • Esophageal abnormality       Surgical History  Past Surgical History:   Procedure Laterality Date   • BREAST BIOPSY Right     Benign   • CATARACT EXTRACTION          Social History  Social History     Socioeconomic History   • Marital status:      Spouse name: Not on file   • Number of children: Not on file   • Years of education: Not on file   • Highest education level: Not on file   Occupational History   • Occupation: Retired Teacher     Comment: [employer]   Tobacco Use   • Smoking status: Never Smoker   • Smokeless tobacco: Never Used   Substance and Sexual Activity   • Alcohol use: No     Frequency: Never   • Drug use: No        Family History  Family History   Problem Relation Age of Onset   • Heart disease Mother    • Diabetes Mother    • Heart disease Father         Health Risk Assessment:  The patient has completed a Health Risk Assessment and it has been reviewed.    Current Medical Providers:  Patient Care Team:  Beth Rosales APRN as PCP - Family Medicine  Connor Carrera MD as Consulting Physician (Gastroenterology)  Patricio Green DO as Consulting Physician (Internal Medicine)  Leonides Mata MD as Consulting Physician (Ophthalmology)  Dr. Morillo - Dentist  Dr. Juarez - Eneida  [facility] [employer] - CPAP    Age-appropriate Screening Schedule:  Refer to the list below for future screening recommendations based on patient's age. Orders for these recommended tests are listed in the plan section. The patient has been provided with a written plan.    Health Maintenance   Topic Date Due   • LIPID PANEL  11/20/2019   • DXA SCAN  05/03/2020   • INFLUENZA VACCINE  08/01/2020   • MEDICARE ANNUAL WELLNESS  07/02/2021   • TDAP/TD VACCINES (2 - Td) 05/30/2029   • Pneumococcal Vaccine Once at 65 Years Old  Completed   • ZOSTER VACCINE  Discontinued       Depression Screen:   PHQ-2/PHQ-9 Depression Screening 7/2/2020   Little interest or pleasure  in doing things 0   Feeling down, depressed, or hopeless 0   Trouble falling or staying asleep, or sleeping too much 0   Feeling tired or having little energy 1   Poor appetite or overeating 0   Feeling bad about yourself - or that you are a failure or have let yourself or your family down 0   Trouble concentrating on things, such as reading the newspaper or watching television 0   Moving or speaking so slowly that other people could have noticed. Or the opposite - being so fidgety or restless that you have been moving around a lot more than usual 0   Thoughts that you would be better off dead, or of hurting yourself in some way 0   Total Score 1   If you checked off any problems, how difficult have these problems made it for you to do your work, take care of things at home, or get along with other people? Not difficult at all       Functional and Cognitive Screening:  Functional & Cognitive Status 7/2/2020   Do you have difficulty preparing food and eating? No   Do you have difficulty bathing yourself, getting dressed or grooming yourself? No   Do you have difficulty using the toilet? No   Do you have difficulty moving around from place to place? No   Do you have trouble with steps or getting out of a bed or a chair? No   Current Diet Well Balanced Diet   Dental Exam Up to date   Eye Exam Up to date   Exercise (times per week) 7 times per week   Current Exercise Activities Include Housecleaning   Do you need help using the phone?  No   Are you deaf or do you have serious difficulty hearing?  No   Do you need help with transportation? No   Do you need help shopping? No   Do you need help preparing meals?  No   Do you need help with housework?  No   Do you need help with laundry? No   Do you need help taking your medications? No   Do you need help managing money? No   Do you ever drive or ride in a car without wearing a seat belt? No   Have you felt unusual stress, anger or loneliness in the last month? No   Who do  you live with? Spouse   If you need help, do you have trouble finding someone available to you? No   Have you been bothered in the last four weeks by sexual problems? No   Do you have difficulty concentrating, remembering or making decisions? No       Does the patient have evidence of cognitive impairment? No    ATTENTION  What is the year: correct (20)  What is the month of the year: correct (20)  What is the day of the week?: correct (20)  What is the date?: correct (20)  MEMORY  Repeat address three times, only score third attempt: Shank[address], [place]: 7 (20)  HOW MANY ANIMALS DID THE PATIENT NAME  Verbal Fluency -- Animal Names (0-25): 22+ (20)  CLOCK DRAWING  Clock Drawing: All Correct (20)  MEMORY RECALL  Tell me what you remember about that name and address we were repeating at the beginnin (20)  ACE TOTAL SCORE  Total ACE Score - <25/30 strongly suggests cognitive impairment; <21/30 almost certainly shows dementia: 30 (20)    Advanced Care Planning:  ACP discussion was held with the patient during this visit. Patient has an advance directive in EMR which is still valid.     Identification of Risk Factors:  Risk factors include: Breast Cancer/Mammogram Screening  Colon Cancer Screening  Immunizations Discussed/Encouraged (specific immunizations; Influenza )  Osteoprorosis Risk.    Review of Systems   Constitutional: Positive for fatigue. Negative for appetite change, chills, diaphoresis, fever, unexpected weight gain and unexpected weight loss.   HENT: Positive for hearing loss. Negative for congestion, ear discharge, ear pain, mouth sores, nosebleeds, postnasal drip, rhinorrhea, sinus pressure, sneezing, sore throat, swollen glands and trouble swallowing.    Eyes: Negative for blurred vision, double vision, pain, discharge, redness and itching.   Respiratory: Negative for  "apnea, cough, choking, shortness of breath, wheezing and stridor.    Cardiovascular: Negative for chest pain, palpitations and leg swelling.   Gastrointestinal: Positive for GERD. Negative for abdominal distention, abdominal pain, anal bleeding, blood in stool, constipation, diarrhea, dysphagia, nausea, rectal pain, vomiting and indigestion.   Endocrine: Negative for cold intolerance, heat intolerance, polydipsia, polyphagia and polyuria.   Genitourinary: Positive for urinary incontinence. Negative for breast discharge, breast lump, breast pain, difficulty urinating, dysuria, flank pain, frequency, genital sores, hematuria, pelvic pain, urgency, vaginal bleeding, vaginal discharge and vaginal pain.        Mild, stress   Musculoskeletal: Positive for arthralgias. Negative for back pain, gait problem, joint swelling, myalgias, neck pain and neck stiffness.        Mild   Skin: Negative for color change, rash and skin lesions.   Neurological: Positive for light-headedness. Negative for dizziness, tremors, seizures, syncope, speech difficulty, weakness, headache, memory problem and confusion.        If bend down and stands up too fast   Hematological: Negative for adenopathy. Does not bruise/bleed easily.   Psychiatric/Behavioral: Negative for self-injury, sleep disturbance, suicidal ideas, depressed mood and stress. The patient is not nervous/anxious.        Objective     Vitals:    07/02/20 0754   BP: 122/73   BP Location: Left arm   Patient Position: Sitting   Cuff Size: Adult   Pulse: 80   Resp: 16   Temp: 97.3 °F (36.3 °C)   TempSrc: Temporal   SpO2: 94%   Weight: 64.9 kg (143 lb)   Height: 160 cm (63\")         07/02/20  0754   Weight: 64.9 kg (143 lb)     Body mass index is 25.33 kg/m².      Physical Exam   Constitutional: She is oriented to person, place, and time. She appears well-developed and well-nourished. She is active. No distress.   HENT:   Head: Normocephalic and atraumatic.   Right Ear: Tympanic " membrane, external ear and ear canal normal. Tympanic membrane is not injected, not erythematous, not retracted and not bulging.   Left Ear: Tympanic membrane, external ear and ear canal normal. Tympanic membrane is not injected, not erythematous, not retracted and not bulging.   Nose: Nose normal. No mucosal edema or rhinorrhea. Right sinus exhibits no maxillary sinus tenderness and no frontal sinus tenderness. Left sinus exhibits no maxillary sinus tenderness and no frontal sinus tenderness.   Mouth/Throat: Uvula is midline, oropharynx is clear and moist and mucous membranes are normal. No oral lesions. No oropharyngeal exudate, posterior oropharyngeal edema or posterior oropharyngeal erythema.   Excessive cerumen in bilateral canals cleared with gentle irrigation.    Eyes: Pupils are equal, round, and reactive to light. Conjunctivae, EOM and lids are normal. Right eye exhibits no discharge. Left eye exhibits no discharge.   Neck: Normal range of motion. Neck supple. No JVD present. Carotid bruit is not present. No tracheal deviation present. No thyroid mass and no thyromegaly present.   Cardiovascular: Normal rate, regular rhythm, normal heart sounds and intact distal pulses. Exam reveals no gallop and no friction rub.   No murmur heard.  Pulmonary/Chest: Effort normal and breath sounds normal. No respiratory distress. She has no wheezes. She has no rales. Right breast exhibits no inverted nipple, no mass, no nipple discharge, no skin change and no tenderness. Left breast exhibits no inverted nipple, no mass, no nipple discharge, no skin change and no tenderness. Breasts are symmetrical.   Abdominal: Soft. Bowel sounds are normal. She exhibits no distension and no mass. There is no hepatosplenomegaly. There is no tenderness. No hernia.   Genitourinary: Rectum normal and uterus normal. Pelvic exam was performed with patient supine. There is no rash, tenderness or lesion on the right labia. There is no rash,  tenderness or lesion on the left labia. Uterus is not tender. Cervix exhibits no motion tenderness, no discharge and no friability. Right adnexum displays no mass, no tenderness and no fullness. Left adnexum displays no mass, no tenderness and no fullness. No erythema or tenderness in the vagina. No vaginal discharge found.   Genitourinary Comments: Grade 2 cystocele.  Atrophic vaginal mucosa.   Musculoskeletal: Normal range of motion. She exhibits no edema or deformity.   Lymphadenopathy:     She has no cervical adenopathy.     She has no axillary adenopathy. No inguinal adenopathy noted on the right or left side.   Neurological: She is alert and oriented to person, place, and time. She has normal strength and normal reflexes. No cranial nerve deficit or sensory deficit. Gait normal.   Skin: Skin is warm, dry and intact. No lesion and no rash noted.   Psychiatric: She has a normal mood and affect. Her speech is normal and behavior is normal. Judgment and thought content normal. Cognition and memory are normal.   Vitals reviewed.    Ear Cerumen Removal  Date/Time: 7/3/2020 11:11 AM  Performed by: Beth Rosales APRN  Authorized by: Beth Rosales APRN     Anesthesia:  Local Anesthetic: none  Location details: left ear and right ear  Patient tolerance: Patient tolerated the procedure well with no immediate complications  Procedure type: irrigation   Sedation:  Patient sedated: no          Office Visit on 07/02/2020   Component Date Value Ref Range Status   • Color 07/02/2020 Yellow  Yellow, Straw, Dark Yellow, Sarah Final   • Clarity, UA 07/02/2020 Clear  Clear Final   • Specific Gravity  07/02/2020 1.025  1.005 - 1.030 Final   • pH, Urine 07/02/2020 5.5  5.0 - 8.0 Final   • Leukocytes 07/02/2020 Trace* Negative Final   • Nitrite, UA 07/02/2020 Negative  Negative Final   • Protein, POC 07/02/2020 Negative  Negative mg/dL Final   • Glucose, UA 07/02/2020 Negative  Negative, 1000 mg/dL (3+) mg/dL Final   • Ketones,  UA 07/02/2020 Negative  Negative Final   • Urobilinogen, UA 07/02/2020 Normal  Normal Final   • Bilirubin 07/02/2020 Negative  Negative Final   • Blood, UA 07/02/2020 Trace* Negative Final    intact     Lab Results   Component Value Date    CHOL 241 (H) 04/28/2018    TRIG 204 (H) 04/28/2018    HDL 61 04/28/2018     MG/DL (CALC) (H) 04/28/2018     Lab Results   Component Value Date    WBC 5.5 11/22/2019    HGB 15.3 11/22/2019    HCT 46.3 11/22/2019    MCV 93 11/22/2019     11/22/2019     Lab Results   Component Value Date    GLUCOSE 87 05/29/2019    BUN 12 11/22/2019    CREATININE 0.93 11/22/2019    EGFRIFNONA 58 (L) 11/22/2019    EGFRIFAFRI 67 11/22/2019    BCR 13 11/22/2019    K 4.7 11/22/2019    CO2 25 11/22/2019    CALCIUM 9.3 11/22/2019    PROTENTOTREF 6.6 11/22/2019    ALBUMIN 4.2 11/22/2019    LABIL2 1.8 11/22/2019    AST 25 11/22/2019    ALT 15 11/22/2019     Lab Results   Component Value Date    IRON 136 05/29/2019    TIBC 306 MCG/DL (CALC) 05/29/2019    FERRITIN 93 05/29/2019     Assessment/Plan   Patient Self-Management and Personalized Health Advice  The patient has been provided with information about: diet, exercise and prevention of cardiac or vascular disease and preventive services including:   · Annual Wellness Visit (AWV)  · Bone Density Measurements  · Influenza Vaccine and Administration  · Screening Mammography   · Screening Pelvic Examinations (Includes a clinical breast examination).    Discussed the patient's BMI with her. The BMI is in the acceptable range.    Diagnoses and all orders for this visit:    1. Encounter for general adult medical examination with abnormal findings (Primary)  Comments:  Age-appropriate health maintenance discussed.  Recommended influenza vaccine this fall.    2. Mixed hyperlipidemia  Assessment & Plan:  Encouraged healthy diet, regular physical activity.  Will call with lab results and further recommendations.    Orders:  -     Lipid Panel  -      Comprehensive Metabolic Panel    3. Osteopenia of multiple sites  Assessment & Plan:  Encouraged weightbearing exercise and increased calcium intake.    She has a high fracture risk and a monthly oral bisphosphonate.  This could be contributing to her GI symptoms.  If she continues to have problems with reflux may need to consider switching to Prolia, however her insurance denied this in the past.  Will call with DEXA results and further recommendations.    Orders:  -     DEXA Bone Density Axial    4. Chronic kidney disease, stage III (moderate) (CMS/HCC)  Assessment & Plan:  EGFR is been running 56-67 over the last several years.  Encouraged her to stay well-hydrated.  Avoid NSAIDs.    Orders:  -     POC Urinalysis Dipstick, Automated  -     CBC & Differential  -     Comprehensive Metabolic Panel    5. Esophageal abnormality  Assessment & Plan:  CT scan done at [facility] ER in March 2020 showed thickening of the wall of the distal esophagus.  The radiologist that this could be due to inflammation, however neoplastic cough could not be ruled out.    Patient reports that the ER doctor did not share this information with her.  We will have her see GI for further evaluation.  Discussed with patient she will likely need EGD to evaluate this further.    Orders:  -     Ambulatory Referral to Gastroenterology    6. Bilateral impacted cerumen  -     Ear Cerumen Removal    7. Mitral valve insufficiency, unspecified etiology  Assessment & Plan:  Mild on previous echo.  Asymptomatic.  We will continue to monitor.      8. Allergic rhinitis, unspecified seasonality, unspecified trigger  Assessment & Plan:  She is doing well on current treatment plan.      9. Multiple pulmonary nodules  Assessment & Plan:  Right lower lobe nodule noted on CT.  These were followed by CT surveillance from [id number] without change.  Considered benign.      10. Obstructive sleep apnea syndrome  Assessment & Plan:  She reports  consistent use of her CPAP machine.      11. Pulmonary emphysema, unspecified emphysema type (CMS/HCC)  Assessment & Plan:  This was noted on CT scan in the past.  She is asymptomatic.  She will report any shortness of breath or cough.        12. Gastroesophageal reflux disease without esophagitis  Assessment & Plan:  Her symptoms have not been as well controlled with the use of Pepcid as they were with Zantac or Nexium.  She has concerns about prolonged use of PPIs and does not want to restart one at this time.  We will have her see GI again for evaluation.  We discussed that they may recommend that she restart PPI based on the EGD findings.      13. Hiatal hernia  Assessment & Plan:  Likely contributing to her reflux.      14. Hemochromatosis, unspecified hemochromatosis type  Assessment & Plan:  Normal CBC, ferritin, and iron studies last year.      15. Irritable bowel syndrome with constipation  Assessment & Plan:  She has had good control with use of Colace and MiraLAX as needed.      16. Female cystocele  Assessment & Plan:  No worsening.  She declined GYN referral to discuss surgical intervention.      17. Atrophic vaginitis  Assessment & Plan:  No change.  She is asymptomatic.      18. Asymptomatic microscopic hematuria  Assessment & Plan:  Asymptomatic.  She will return in 6 to 8 weeks for clean-catch midstream urinalysis.      19. Herpetic lesions  Assessment & Plan:  No recent outbreaks.      20. Seasonal affective disorder (CMS/HCC)  Assessment & Plan:  She reports that she did feel down during winter months but is feeling very well now.      21. Recurrent isolated sleep paralysis  Assessment & Plan:  No recent problems.      22. Screening for colon cancer  -     Cologuard - Stool, Per Rectum; Future    Other orders  -     risedronate (ACTONEL) 150 MG tablet; Take 1 tablet by mouth Every 30 (Thirty) Days. with water on empty stomach, nothing by mouth or lie down for next 30 minutes.  Dispense: 4 tablet;  Refill: 3      Orders:  Orders Placed This Encounter   Procedures   • Ear Cerumen Removal   • DEXA Bone Density Axial   • Lipid Panel   • Cologuard - Stool, Per Rectum   • Comprehensive Metabolic Panel   • Ambulatory Referral to Gastroenterology   • POC Urinalysis Dipstick, Automated   • CBC & Differential       Follow Up:  Return in about 1 year (around 7/2/2021) for Medicare Wellness.     An After Visit Summary and PPPS with all of these plans were given to the patient.

## 2020-07-03 PROBLEM — K22.9 ESOPHAGEAL ABNORMALITY: Status: ACTIVE | Noted: 2020-07-03

## 2020-07-03 PROBLEM — D75.1 POLYCYTHEMIA: Status: RESOLVED | Noted: 2018-05-03 | Resolved: 2020-07-03

## 2020-07-03 PROCEDURE — 69209 REMOVE IMPACTED EAR WAX UNI: CPT | Performed by: NURSE PRACTITIONER

## 2020-07-03 NOTE — ASSESSMENT & PLAN NOTE
Encouraged healthy diet, regular physical activity.  Will call with lab results and further recommendations.

## 2020-07-03 NOTE — ASSESSMENT & PLAN NOTE
EGFR is been running 56-67 over the last several years.  Encouraged her to stay well-hydrated.  Avoid NSAIDs.

## 2020-07-03 NOTE — ASSESSMENT & PLAN NOTE
Right lower lobe nodule noted on CT.  These were followed by CT surveillance from [id number] without change.  Considered benign.

## 2020-07-03 NOTE — ASSESSMENT & PLAN NOTE
Her symptoms have not been as well controlled with the use of Pepcid as they were with Zantac or Nexium.  She has concerns about prolonged use of PPIs and does not want to restart one at this time.  We will have her see GI again for evaluation.  We discussed that they may recommend that she restart PPI based on the EGD findings.

## 2020-07-03 NOTE — ASSESSMENT & PLAN NOTE
CT scan done at [facility] ER in March 2020 showed thickening of the wall of the distal esophagus.  The radiologist that this could be due to inflammation, however neoplastic cough could not be ruled out.    Patient reports that the ER doctor did not share this information with her.  We will have her see GI for further evaluation.  Discussed with patient she will likely need EGD to evaluate this further.

## 2020-07-03 NOTE — ASSESSMENT & PLAN NOTE
Encouraged weightbearing exercise and increased calcium intake.    She has a high fracture risk and a monthly oral bisphosphonate.  This could be contributing to her GI symptoms.  If she continues to have problems with reflux may need to consider switching to Prolia, however her insurance denied this in the past.  Will call with DEXA results and further recommendations.

## 2020-07-03 NOTE — ASSESSMENT & PLAN NOTE
This was noted on CT scan in the past.  She is asymptomatic.  She will report any shortness of breath or cough.

## 2020-07-07 ENCOUNTER — RESULTS ENCOUNTER (OUTPATIENT)
Dept: FAMILY MEDICINE CLINIC | Facility: CLINIC | Age: 80
End: 2020-07-07

## 2020-07-07 DIAGNOSIS — Z12.11 SCREENING FOR COLON CANCER: ICD-10-CM

## 2020-07-08 ENCOUNTER — HOSPITAL ENCOUNTER (OUTPATIENT)
Dept: BONE DENSITY | Facility: HOSPITAL | Age: 80
Discharge: HOME | End: 2020-07-08
Admitting: NURSE PRACTITIONER

## 2020-07-08 PROCEDURE — 77080 DXA BONE DENSITY AXIAL: CPT

## 2020-07-09 LAB
ALBUMIN SERPL-MCNC: 4.4 G/DL (ref 3.6–4.6)
ALBUMIN/GLOB SERPL: 1.8 {RATIO} (ref 1.2–2.2)
ALP SERPL-CCNC: 49 IU/L (ref 39–117)
ALT SERPL-CCNC: 18 IU/L (ref 0–32)
AST SERPL-CCNC: 30 IU/L (ref 0–40)
BASOPHILS # BLD AUTO: 0.1 X10E3/UL (ref 0–0.2)
BASOPHILS NFR BLD AUTO: 1 %
BILIRUB SERPL-MCNC: 0.6 MG/DL (ref 0–1.2)
BUN SERPL-MCNC: 14 MG/DL (ref 8–27)
BUN/CREAT SERPL: 14 (ref 12–28)
CALCIUM SERPL-MCNC: 9.5 MG/DL (ref 8.7–10.3)
CHLORIDE SERPL-SCNC: 103 MMOL/L (ref 96–106)
CHOLEST SERPL-MCNC: 210 MG/DL (ref 100–199)
CO2 SERPL-SCNC: 26 MMOL/L (ref 20–29)
CREAT SERPL-MCNC: 0.97 MG/DL (ref 0.57–1)
EOSINOPHIL # BLD AUTO: 0.2 X10E3/UL (ref 0–0.4)
EOSINOPHIL NFR BLD AUTO: 4 %
ERYTHROCYTE [DISTWIDTH] IN BLOOD BY AUTOMATED COUNT: 13.1 % (ref 11.7–15.4)
GLOBULIN SER CALC-MCNC: 2.4 G/DL (ref 1.5–4.5)
GLUCOSE SERPL-MCNC: 97 MG/DL (ref 65–99)
HCT VFR BLD AUTO: 46 % (ref 34–46.6)
HDLC SERPL-MCNC: 53 MG/DL
HGB BLD-MCNC: 15.4 G/DL (ref 11.1–15.9)
IMM GRANULOCYTES # BLD AUTO: 0 X10E3/UL (ref 0–0.1)
IMM GRANULOCYTES NFR BLD AUTO: 0 %
LDLC SERPL CALC-MCNC: 125 MG/DL (ref 0–99)
LYMPHOCYTES # BLD AUTO: 2.2 X10E3/UL (ref 0.7–3.1)
LYMPHOCYTES NFR BLD AUTO: 37 %
MCH RBC QN AUTO: 31.4 PG (ref 26.6–33)
MCHC RBC AUTO-ENTMCNC: 33.5 G/DL (ref 31.5–35.7)
MCV RBC AUTO: 94 FL (ref 79–97)
MONOCYTES # BLD AUTO: 0.5 X10E3/UL (ref 0.1–0.9)
MONOCYTES NFR BLD AUTO: 8 %
NEUTROPHILS # BLD AUTO: 3 X10E3/UL (ref 1.4–7)
NEUTROPHILS NFR BLD AUTO: 50 %
PLATELET # BLD AUTO: 194 X10E3/UL (ref 150–450)
POTASSIUM SERPL-SCNC: 4.3 MMOL/L (ref 3.5–5.2)
PROT SERPL-MCNC: 6.8 G/DL (ref 6–8.5)
RBC # BLD AUTO: 4.9 X10E6/UL (ref 3.77–5.28)
SODIUM SERPL-SCNC: 143 MMOL/L (ref 134–144)
TRIGL SERPL-MCNC: 162 MG/DL (ref 0–149)
VLDLC SERPL CALC-MCNC: 32 MG/DL (ref 5–40)
WBC # BLD AUTO: 5.9 X10E3/UL (ref 3.4–10.8)

## 2020-08-19 ENCOUNTER — TELEPHONE (OUTPATIENT)
Dept: FAMILY MEDICINE CLINIC | Facility: CLINIC | Age: 80
End: 2020-08-19

## 2020-08-19 DIAGNOSIS — R31.21 ASYMPTOMATIC MICROSCOPIC HEMATURIA: Primary | ICD-10-CM

## 2020-08-19 NOTE — TELEPHONE ENCOUNTER
----- Message from JESSICA Hall sent at 7/2/2020  8:36 AM EDT -----  Regarding: Repeat UA f/up hematuria  [facility]

## 2020-08-20 DIAGNOSIS — R31.21 ASYMPTOMATIC MICROSCOPIC HEMATURIA: ICD-10-CM

## 2020-08-22 LAB
APPEARANCE UR: CLEAR
BACTERIA #/AREA URNS HPF: NORMAL /[HPF]
BACTERIA UR CULT: NORMAL
BACTERIA UR CULT: NORMAL
BILIRUB UR QL STRIP: NEGATIVE
COLOR UR: YELLOW
EPI CELLS #/AREA URNS HPF: NORMAL /HPF (ref 0–10)
GLUCOSE UR QL: NEGATIVE
HGB UR QL STRIP: NEGATIVE
KETONES UR QL STRIP: NEGATIVE
LEUKOCYTE ESTERASE UR QL STRIP: ABNORMAL
MICRO URNS: ABNORMAL
MUCOUS THREADS URNS QL MICRO: PRESENT
NITRITE UR QL STRIP: NEGATIVE
PH UR STRIP: 5 [PH] (ref 5–7.5)
PROT UR QL STRIP: ABNORMAL
RBC #/AREA URNS HPF: NORMAL /HPF (ref 0–2)
SP GR UR: 1.02 (ref 1–1.03)
URINALYSIS REFLEX: ABNORMAL
UROBILINOGEN UR STRIP-MCNC: 0.2 MG/DL (ref 0.2–1)
WBC #/AREA URNS HPF: NORMAL /HPF (ref 0–5)

## 2020-09-02 ENCOUNTER — OFFICE (OUTPATIENT)
Dept: RURAL CLINIC 3 | Facility: CLINIC | Age: 80
End: 2020-09-02
Payer: COMMERCIAL

## 2020-09-02 VITALS
DIASTOLIC BLOOD PRESSURE: 72 MMHG | WEIGHT: 142 LBS | SYSTOLIC BLOOD PRESSURE: 128 MMHG | HEART RATE: 70 BPM | HEIGHT: 63 IN

## 2020-09-02 DIAGNOSIS — K21.9 GASTRO-ESOPHAGEAL REFLUX DISEASE WITHOUT ESOPHAGITIS: ICD-10-CM

## 2020-09-02 PROCEDURE — 99202 OFFICE O/P NEW SF 15 MIN: CPT | Performed by: INTERNAL MEDICINE

## 2021-06-04 ENCOUNTER — HOSPITAL ENCOUNTER (OUTPATIENT)
Dept: MAMMOGRAPHY | Facility: HOSPITAL | Age: 81
Discharge: HOME | End: 2021-06-04
Admitting: NURSE PRACTITIONER

## 2021-06-04 DIAGNOSIS — Z12.31 SCREENING MAMMOGRAM, ENCOUNTER FOR: ICD-10-CM

## 2021-06-04 PROCEDURE — 77067 SCR MAMMO BI INCL CAD: CPT

## 2021-06-04 PROCEDURE — 77063 BREAST TOMOSYNTHESIS BI: CPT

## 2021-07-28 PROBLEM — B00.9 HERPES SIMPLEX WITHOUT COMPLICATION: Status: ACTIVE | Noted: 2021-07-28

## 2021-07-28 PROBLEM — R06.83 SNORING: Status: ACTIVE | Noted: 2021-07-28

## 2021-07-28 RX ORDER — RISEDRONATE SODIUM 150 MG/1
150 TABLET, FILM COATED ORAL
Qty: 3 TABLET | Refills: 5 | OUTPATIENT
Start: 2021-07-28

## 2021-07-28 RX ORDER — RISEDRONATE SODIUM 150 MG/1
150 TABLET, FILM COATED ORAL
Qty: 3 TABLET | Refills: 0 | Status: SHIPPED | OUTPATIENT
Start: 2021-07-28 | End: 2021-09-09 | Stop reason: SDUPTHER

## 2021-07-28 RX ORDER — CYCLOSPORINE 0.5 MG/ML
EMULSION OPHTHALMIC
COMMUNITY
Start: 2021-07-21

## 2021-09-09 ENCOUNTER — OFFICE VISIT (OUTPATIENT)
Dept: FAMILY MEDICINE CLINIC | Facility: CLINIC | Age: 81
End: 2021-09-09

## 2021-09-09 VITALS
WEIGHT: 146.6 LBS | BODY MASS INDEX: 25.98 KG/M2 | DIASTOLIC BLOOD PRESSURE: 74 MMHG | SYSTOLIC BLOOD PRESSURE: 127 MMHG | HEART RATE: 63 BPM | TEMPERATURE: 97.3 F | OXYGEN SATURATION: 94 % | RESPIRATION RATE: 16 BRPM | HEIGHT: 63 IN

## 2021-09-09 DIAGNOSIS — M85.89 OSTEOPENIA OF MULTIPLE SITES: ICD-10-CM

## 2021-09-09 DIAGNOSIS — K58.1 IRRITABLE BOWEL SYNDROME WITH CONSTIPATION: ICD-10-CM

## 2021-09-09 DIAGNOSIS — Z00.01 ENCOUNTER FOR GENERAL ADULT MEDICAL EXAMINATION WITH ABNORMAL FINDINGS: Primary | ICD-10-CM

## 2021-09-09 DIAGNOSIS — E78.2 MIXED HYPERLIPIDEMIA: ICD-10-CM

## 2021-09-09 DIAGNOSIS — K21.9 GASTROESOPHAGEAL REFLUX DISEASE WITHOUT ESOPHAGITIS: ICD-10-CM

## 2021-09-09 DIAGNOSIS — F41.9 ANXIETY: ICD-10-CM

## 2021-09-09 DIAGNOSIS — E83.119 HEMOCHROMATOSIS, UNSPECIFIED HEMOCHROMATOSIS TYPE: ICD-10-CM

## 2021-09-09 DIAGNOSIS — I34.0 MITRAL VALVE INSUFFICIENCY, UNSPECIFIED ETIOLOGY: ICD-10-CM

## 2021-09-09 DIAGNOSIS — G47.33 OBSTRUCTIVE SLEEP APNEA SYNDROME: ICD-10-CM

## 2021-09-09 DIAGNOSIS — J43.9 PULMONARY EMPHYSEMA, UNSPECIFIED EMPHYSEMA TYPE (HCC): ICD-10-CM

## 2021-09-09 DIAGNOSIS — Z12.31 BREAST CANCER SCREENING BY MAMMOGRAM: ICD-10-CM

## 2021-09-09 DIAGNOSIS — J30.9 ALLERGIC RHINITIS, UNSPECIFIED SEASONALITY, UNSPECIFIED TRIGGER: ICD-10-CM

## 2021-09-09 DIAGNOSIS — G47.53 RECURRENT ISOLATED SLEEP PARALYSIS: ICD-10-CM

## 2021-09-09 DIAGNOSIS — N18.31 STAGE 3A CHRONIC KIDNEY DISEASE (HCC): ICD-10-CM

## 2021-09-09 PROBLEM — R06.83 SNORING: Status: RESOLVED | Noted: 2021-07-28 | Resolved: 2021-09-09

## 2021-09-09 PROCEDURE — G0439 PPPS, SUBSEQ VISIT: HCPCS | Performed by: NURSE PRACTITIONER

## 2021-09-09 RX ORDER — RISEDRONATE SODIUM 150 MG/1
150 TABLET, FILM COATED ORAL
Qty: 3 TABLET | Refills: 3 | Status: SHIPPED | OUTPATIENT
Start: 2021-09-09 | End: 2022-08-16 | Stop reason: SDUPTHER

## 2021-09-09 NOTE — ASSESSMENT & PLAN NOTE
Noted on March 2020 CT scan.  She saw Dr. Carrera in September 2020.  He felt that abnormality was likely due to reflux and had her restart Nexium.

## 2021-09-09 NOTE — PROGRESS NOTES
Medicare Annual Wellness Visit  Chief Complaint   Patient presents with   • Medicare Wellness-subsequent       Subjective     Elizabeth Evans is a 81 y.o. female who presents for an Annual Wellness Visit. In addition, we addressed the following health issues:    HPI    Medications    Current Outpatient Medications:   •  calcium-vitamin D (Calcium 500+D) 500-200 MG-UNIT per tablet, Take 1 tablet by mouth Daily., Disp: , Rfl:   •  cetirizine (zyrTEC) 10 MG tablet, Take 10 mg by mouth Daily., Disp: , Rfl:   •  esomeprazole (nexIUM) 20 MG capsule, Take 20 mg by mouth Every Morning Before Breakfast., Disp: , Rfl:   •  Omega-3 Fatty Acids (FISH OIL) 1000 MG capsule capsule, FISH OIL 1000 MG CAPS, Disp: , Rfl:   •  polyethylene glycol (MIRALAX) 17 GM/SCOOP powder, Take 17 g by mouth Daily., Disp: , Rfl:   •  Restasis 0.05 % ophthalmic emulsion, INSTILL 1 DROPS INTO AFFECTED EYE EVERY 12 HOURS, Disp: , Rfl:   •  risedronate (ACTONEL) 150 MG tablet, Take 1 tablet by mouth Every 30 (Thirty) Days. with water on empty stomach, nothing by mouth or lie down for next 30 minutes., Disp: 3 tablet, Rfl: 3     Problem List  Patient Active Problem List   Diagnosis   • Allergic rhinitis   • Chronic kidney disease, stage III (moderate) (CMS/[facility])   • Gastroesophageal reflux disease   • Hand paresthesia   • Hearing loss   • Hematuria, unspecified   • Hemochromatosis   • Hiatal hernia   • Hyperlipidemia   • Irritable bowel syndrome   • Mitral regurgitation   • Multiple pulmonary nodules   • Osteopenia of multiple sites   • Pulmonary emphysema (CMS/HCC)   • Recurrent isolated sleep paralysis   • Rosacea   • Seasonal affective disorder (CMS/HCC)   • Obstructive sleep apnea syndrome   • Abnormal CT scan, esophagus   • Herpetic lesions   • History of ehrlichiosis   • Atrophic vaginitis   • Dry eye syndrome   • Female cystocele   • Esophageal abnormality   • Herpes simplex without complication   • Anxiety       Surgical History  Past Surgical  History:   Procedure Laterality Date   • BREAST BIOPSY Right     Benign   • CATARACT EXTRACTION          Social History  Social History     Socioeconomic History   • Marital status:      Spouse name: Not on file   • Number of children: Not on file   • Years of education: Not on file   • Highest education level: Not on file   Tobacco Use   • Smoking status: Never Smoker   • Smokeless tobacco: Never Used   Vaping Use   • Vaping Use: Never used   Substance and Sexual Activity   • Alcohol use: No   • Drug use: No   • Sexual activity: Defer        Family History  Family History   Problem Relation Age of Onset   • Heart disease Mother    • Diabetes Mother    • Heart disease Father         Health Risk Assessment:  The patient has completed a Health Risk Assessment and it has been reviewed.    Current Medical Providers:  Patient Care Team:  Beth Rosales APRN as PCP - General (Nurse Practitioner)  Connor Carrera MD as Consulting Physician (Gastroenterology)  Ptaricio Green DO as Consulting Physician (Internal Medicine)  Leonides Mata MD as Consulting Physician (Ophthalmology)    Age-appropriate Screening Schedule:  Refer to the list below for future screening recommendations based on patient's age. Orders for these recommended tests are listed in the plan section. The patient has been provided with a written plan.    Health Maintenance   Topic Date Due   • ANNUAL WELLNESS VISIT  07/02/2021   • LIPID PANEL  07/08/2021   • INFLUENZA VACCINE  10/01/2021   • DXA SCAN  07/08/2022   • TDAP/TD VACCINES (2 - Td or Tdap) 05/30/2029   • COVID-19 Vaccine  Completed   • Pneumococcal Vaccine 65+  Completed   • ZOSTER VACCINE  Discontinued       Depression Screen:   PHQ-2/PHQ-9 Depression Screening 9/9/2021   Little interest or pleasure in doing things 1   Feeling down, depressed, or hopeless 1   Trouble falling or staying asleep, or sleeping too much 0   Feeling tired or having little energy 1   Poor  appetite or overeating 1   Feeling bad about yourself - or that you are a failure or have let yourself or your family down 0   Trouble concentrating on things, such as reading the newspaper or watching television 1   Moving or speaking so slowly that other people could have noticed. Or the opposite - being so fidgety or restless that you have been moving around a lot more than usual 0   Thoughts that you would be better off dead, or of hurting yourself in some way 0   Total Score 5   If you checked off any problems, how difficult have these problems made it for you to do your work, take care of things at home, or get along with other people? Not difficult at all       Functional and Cognitive Screening:  Functional & Cognitive Status 9/9/2021   Do you have difficulty preparing food and eating? No   Do you have difficulty bathing yourself, getting dressed or grooming yourself? No   Do you have difficulty using the toilet? No   Do you have difficulty moving around from place to place? No   Do you have trouble with steps or getting out of a bed or a chair? No   Current Diet Well Balanced Diet   Dental Exam Up to date   Eye Exam Up to date   Exercise (times per week) 5 times per week   Current Exercises Include Walking   Current Exercise Activities Include -   Do you need help using the phone?  No   Are you deaf or do you have serious difficulty hearing?  No   Do you need help with transportation? No   Do you need help shopping? No   Do you need help preparing meals?  No   Do you need help with housework?  No   Do you need help with laundry? No   Do you need help taking your medications? No   Do you need help managing money? No   Do you ever drive or ride in a car without wearing a seat belt? No   Have you felt unusual stress, anger or loneliness in the last month? No   Who do you live with? Spouse   If you need help, do you have trouble finding someone available to you? No   Have you been bothered in the last four  weeks by sexual problems? -   Do you have difficulty concentrating, remembering or making decisions? No       Does the patient have evidence of cognitive impairment? No    ATTENTION  What is the year: correct (21)  What is the month of the year: correct (21)  What is the day of the week?: correct (21)  What is the date?: correct (21)  MEMORY  Repeat address three times, only score third attempt: Shankar S[address], [place]: 7 (21)  HOW MANY ANIMALS DID THE PATIENT NAME  Verbal Fluency -- Animal Names (0-25): 22+ (21)  CLOCK DRAWING  Clock Drawing: All Correct (21)  MEMORY RECALL  Tell me what you remember about that name and address we were repeating at the beginnin (21)  ACE TOTAL SCORE  Total ACE Score - <25/30 strongly suggests cognitive impairment; <21/30 almost certainly shows dementia: 30 (21)    Advanced Care Planning:  ACP discussion was held with the patient during this visit. Patient has an advance directive in EMR which is still valid.     Identification of Risk Factors:  Risk factors include: Breast Cancer/Mammogram Screening  Osteoporosis Risk.    Review of Systems   Constitutional: Positive for fatigue (mild, no change). Negative for appetite change, chills, diaphoresis, fever, unexpected weight gain and unexpected weight loss.   HENT: Positive for hearing loss (wears hearing aid). Negative for congestion, ear discharge, ear pain, mouth sores, nosebleeds, postnasal drip, rhinorrhea, sinus pressure, sneezing, sore throat, swollen glands and trouble swallowing.    Eyes: Negative for blurred vision, double vision, pain, discharge, redness and itching.        Dry   Respiratory: Negative for apnea, cough, choking, shortness of breath, wheezing and stridor.    Cardiovascular: Negative for chest pain, palpitations and leg swelling.   Gastrointestinal: Negative for abdominal distention,  "abdominal pain, anal bleeding, blood in stool, constipation, diarrhea, nausea, rectal pain, vomiting and indigestion.   Endocrine: Negative for cold intolerance, heat intolerance, polydipsia, polyphagia and polyuria.   Genitourinary: Positive for urinary incontinence (Mild, stress). Negative for breast discharge, breast lump, breast pain, difficulty urinating, dysuria, flank pain, frequency, genital sores, hematuria, pelvic pain, urgency, vaginal bleeding, vaginal discharge and vaginal pain.   Musculoskeletal: Positive for arthralgias (mild). Negative for back pain, gait problem, joint swelling, myalgias, neck pain and neck stiffness.   Skin: Negative for color change, rash and skin lesions.   Neurological: Positive for light-headedness (If bend down and stands up too fast, no change). Negative for dizziness, tremors, seizures, syncope, speech difficulty, weakness, headache, memory problem and confusion.   Hematological: Negative for adenopathy. Does not bruise/bleed easily.   Psychiatric/Behavioral: Positive for stress. Negative for self-injury, sleep disturbance, suicidal ideas and depressed mood. The patient is nervous/anxious (mild).      Objective     Vitals:    09/09/21 1002   BP: 127/74   BP Location: Left arm   Patient Position: Sitting   Cuff Size: Adult   Pulse: 63   Resp: 16   Temp: 97.3 °F (36.3 °C)   TempSrc: Infrared   SpO2: 94%   Weight: 66.5 kg (146 lb 9.6 oz)   Height: 160 cm (63\")         09/09/21  1002   Weight: 66.5 kg (146 lb 9.6 oz)     Body mass index is 25.97 kg/m².    Physical Exam  Vitals reviewed.   Constitutional:       General: She is not in acute distress.     Appearance: She is well-developed. She is not diaphoretic.   HENT:      Head: Normocephalic and atraumatic.      Right Ear: Tympanic membrane, ear canal and external ear normal. Tympanic membrane is not injected, erythematous or retracted.      Left Ear: Tympanic membrane, ear canal and external ear normal. Tympanic membrane is " not injected, erythematous or retracted.      Nose: Nose normal. No mucosal edema or rhinorrhea.      Right Sinus: No maxillary sinus tenderness or frontal sinus tenderness.      Left Sinus: No maxillary sinus tenderness or frontal sinus tenderness.      Mouth/Throat:      Mouth: No oral lesions.      Pharynx: Uvula midline. No oropharyngeal exudate.   Eyes:      General: Lids are normal.         Right eye: No discharge.         Left eye: No discharge.      Conjunctiva/sclera: Conjunctivae normal.      Pupils: Pupils are equal, round, and reactive to light.   Neck:      Thyroid: No thyroid mass or thyromegaly.      Vascular: No carotid bruit or JVD.      Trachea: No tracheal deviation.   Cardiovascular:      Rate and Rhythm: Normal rate and regular rhythm.      Heart sounds: Normal heart sounds. No murmur heard.   No friction rub. No gallop.    Pulmonary:      Effort: Pulmonary effort is normal. No respiratory distress.      Breath sounds: Normal breath sounds. No wheezing or rales.   Chest:      Breasts: Breasts are symmetrical.         Right: No inverted nipple, mass, nipple discharge, skin change or tenderness.         Left: No inverted nipple, mass, nipple discharge, skin change or tenderness.   Abdominal:      General: Bowel sounds are normal. There is no distension.      Palpations: Abdomen is soft. There is no mass.      Tenderness: There is no abdominal tenderness.      Hernia: No hernia is present.   Genitourinary:     Exam position: Supine.      Labia:         Right: No rash, tenderness or lesion.         Left: No rash, tenderness or lesion.       Vagina: Normal. No vaginal discharge.      Adnexa:         Right: No mass, tenderness or fullness.          Left: No mass, tenderness or fullness.        Rectum: Normal.      Comments: Cervix & uterus are surgically absent.  Musculoskeletal:         General: No deformity. Normal range of motion.      Cervical back: Normal range of motion and neck supple.    Lymphadenopathy:      Cervical: No cervical adenopathy.      Lower Body: No right inguinal adenopathy. No left inguinal adenopathy.   Skin:     General: Skin is warm and dry.      Findings: No lesion or rash.   Neurological:      Mental Status: She is alert and oriented to person, place, and time.      Cranial Nerves: No cranial nerve deficit.      Sensory: No sensory deficit.      Gait: Gait normal.      Deep Tendon Reflexes: Reflexes are normal and symmetric.   Psychiatric:         Speech: Speech normal.         Behavior: Behavior normal.         Thought Content: Thought content normal.         Judgment: Judgment normal.       RECORDS REVIEWED TODAY:  9/2/20 GI Note    Lab Results   Component Value Date    WBC 5.9 07/08/2020    HGB 15.4 07/08/2020    HCT 46.0 07/08/2020    MCV 94 07/08/2020     07/08/2020     Lab Results   Component Value Date    GLUCOSE 87 05/29/2019    BUN 14 07/08/2020    CREATININE 0.97 07/08/2020    EGFRIFNONA 55 (L) 07/08/2020    EGFRIFAFRI 63 07/08/2020    BCR 14 07/08/2020    K 4.3 07/08/2020    CO2 26 07/08/2020    CALCIUM 9.5 07/08/2020    PROTENTOTREF 6.8 07/08/2020    ALBUMIN 4.4 07/08/2020    LABIL2 1.8 07/08/2020    AST 30 07/08/2020    ALT 18 07/08/2020     Lab Results   Component Value Date    CHOL 241 (H) 04/28/2018    CHLPL 210 (H) 07/08/2020    TRIG 162 (H) 07/08/2020    HDL 53 07/08/2020     (H) 07/08/2020     Lab Results   Component Value Date    TSH 2.84 04/28/2018       Assessment/Plan   Patient Self-Management and Personalized Health Advice  The patient has been provided with information about: prevention of cardiac or vascular disease and mental health concerns and preventive services including:   · Screening Mammography .    Discussed the patient's BMI with her. The BMI is in the acceptable range.    Diagnoses and all orders for this visit:    1. Encounter for general adult medical examination with abnormal findings (Primary)  Comments:  Discussed age  appropriate health maintenance.     2. Mixed hyperlipidemia  Assessment & Plan:  Reviewed 2020 lipid results with patient.  Encouraged healthy eating, regular exercise.  She declines repeat of lipid panel this year.    Orders:  -     Comprehensive Metabolic Panel    3. Allergic rhinitis, unspecified seasonality, unspecified trigger  Assessment & Plan:  Mild.  Controlled with as needed use of oral antihistamine.      4. Mitral valve insufficiency, unspecified etiology  Assessment & Plan:  Mild on 2014 echo.  She remains asymptomatic.      5. Hemochromatosis, unspecified hemochromatosis type  Assessment & Plan:  Normal CBC in 2020.      6. Gastroesophageal reflux disease without esophagitis  Assessment & Plan:  She has tried and failed a trial of Pepcid.  She has good control as long as she takes Nexium daily.      7. Irritable bowel syndrome with constipation  Assessment & Plan:  Better recently      8. Stage 3a chronic kidney disease (CMS/HCC)  Assessment & Plan:  Continue to avoid NSAIDs and stay well-hydrated.    Orders:  -     Comprehensive Metabolic Panel    9. Osteopenia of multiple sites  Assessment & Plan:  She is tolerating oral bisphosphonate.  Exercise and increased calcium intake.  Repeat DEXA in July 2022.      10. Pulmonary emphysema, unspecified emphysema type (CMS/HCC)  Assessment & Plan:  Noted on CT.  She remains asymptomatic.        11. Recurrent isolated sleep paralysis  Assessment & Plan:  No recent problems.      12. Obstructive sleep apnea syndrome  Assessment & Plan:  Continue consistent use of CPAP.  Continue follow-up with Dr. Mak.      13. Anxiety  Assessment & Plan:  She is having mild anxiety that is primarily related to the COVID-19 pandemic and other world issues.  She watches the news frequently, which gets her upset.  Advised that she limit the amount of days that she watches.  Return if this is worsening.      14. Breast cancer screening by mammogram  Comments:  Negative screening  mammogram in June 2020.    Other orders  -     risedronate (ACTONEL) 150 MG tablet; Take 1 tablet by mouth Every 30 (Thirty) Days. with water on empty stomach, nothing by mouth or lie down for next 30 minutes.  Dispense: 3 tablet; Refill: 3      Orders:  Orders Placed This Encounter   Procedures   • Comprehensive Metabolic Panel       Follow Up:  Return in about 1 year (around 9/9/2022) for Medicare Wellness.     An After Visit Summary and PPPS with all of these plans were given to the patient.

## 2021-09-09 NOTE — ASSESSMENT & PLAN NOTE
She has tried and failed a trial of Pepcid.  She has good control as long as she takes Nexium daily.

## 2021-09-09 NOTE — ASSESSMENT & PLAN NOTE
She is tolerating oral bisphosphonate.  Exercise and increased calcium intake.  Repeat DEXA in July 2022.

## 2021-09-09 NOTE — ASSESSMENT & PLAN NOTE
She is having mild anxiety that is primarily related to the COVID-19 pandemic and other world issues.  She watches the news frequently, which gets her upset.  Advised that she limit the amount of days that she watches.  Return if this is worsening.

## 2021-09-09 NOTE — ASSESSMENT & PLAN NOTE
Reviewed 2020 lipid results with patient.  Encouraged healthy eating, regular exercise.  She declines repeat of lipid panel this year.

## 2021-09-10 LAB
ALBUMIN SERPL-MCNC: 4.6 G/DL (ref 3.6–4.6)
ALBUMIN/GLOB SERPL: 2 {RATIO} (ref 1.2–2.2)
ALP SERPL-CCNC: 59 IU/L (ref 48–121)
ALT SERPL-CCNC: 15 IU/L (ref 0–32)
AST SERPL-CCNC: 25 IU/L (ref 0–40)
BILIRUB SERPL-MCNC: 0.5 MG/DL (ref 0–1.2)
BUN SERPL-MCNC: 15 MG/DL (ref 8–27)
BUN/CREAT SERPL: 16 (ref 12–28)
CALCIUM SERPL-MCNC: 10 MG/DL (ref 8.7–10.3)
CHLORIDE SERPL-SCNC: 103 MMOL/L (ref 96–106)
CO2 SERPL-SCNC: 25 MMOL/L (ref 20–29)
CREAT SERPL-MCNC: 0.93 MG/DL (ref 0.57–1)
GLOBULIN SER CALC-MCNC: 2.3 G/DL (ref 1.5–4.5)
GLUCOSE SERPL-MCNC: 92 MG/DL (ref 65–99)
POTASSIUM SERPL-SCNC: 4.4 MMOL/L (ref 3.5–5.2)
PROT SERPL-MCNC: 6.9 G/DL (ref 6–8.5)
SODIUM SERPL-SCNC: 142 MMOL/L (ref 134–144)

## 2022-05-19 ENCOUNTER — TRANSCRIBE ORDERS (OUTPATIENT)
Dept: ADMINISTRATIVE | Facility: HOSPITAL | Age: 82
End: 2022-05-19

## 2022-05-19 DIAGNOSIS — Z12.31 OTHER SCREENING MAMMOGRAM: Primary | ICD-10-CM

## 2022-06-06 ENCOUNTER — HOSPITAL ENCOUNTER (OUTPATIENT)
Dept: MAMMOGRAPHY | Facility: HOSPITAL | Age: 82
Discharge: HOME | End: 2022-06-06
Admitting: ALLERGY & IMMUNOLOGY

## 2022-06-06 DIAGNOSIS — Z12.31 OTHER SCREENING MAMMOGRAM: ICD-10-CM

## 2022-06-06 PROCEDURE — 77067 SCR MAMMO BI INCL CAD: CPT

## 2022-06-06 PROCEDURE — 77063 BREAST TOMOSYNTHESIS BI: CPT

## 2022-07-18 ENCOUNTER — TELEPHONE (OUTPATIENT)
Dept: PEDIATRICS | Facility: OTHER | Age: 82
End: 2022-07-18

## 2022-07-18 ENCOUNTER — TELEPHONE (OUTPATIENT)
Dept: FAMILY MEDICINE CLINIC | Facility: CLINIC | Age: 82
End: 2022-07-18

## 2022-07-18 NOTE — TELEPHONE ENCOUNTER
----- Message from Tabatha Cisneros sent at 7/11/2022  3:24 PM EDT -----  Regarding: FW: DEXA due    ----- Message -----  From: Beth Rosales APRN  Sent: 7/11/2022  12:00 AM EDT  To: JESSICA Hall  Subject: DEXA due                                         DEXA is due.  Mammogram was due in June, she has not had it done already have her do both at the same time

## 2022-07-18 NOTE — TELEPHONE ENCOUNTER
Caller:     Relationship:     Best call back number:[phone]      What is the best time to reach you: ANYTIME       Who are you requesting to speak with (clinical staff, provider,  specific staff member): CLINICAL STAFF      Do you know the name of the person who called:SELF      What was the call regarding: PATIENT IS OFF BOARDING DR. HOUSE AND SHE WILL RUN OUT OF RISEDRONATE IN September. PLEASE CALL HER .      Do you require a callback: YES

## 2022-07-18 NOTE — TELEPHONE ENCOUNTER
Patient is also due for AWV in September can schedule to do AWV and NP can order her mammogram and dexa scan at that visit. VM was left for patient to call office to schedule to establish care with new PCP.

## 2022-08-16 ENCOUNTER — OFFICE VISIT (OUTPATIENT)
Dept: FAMILY MEDICINE CLINIC | Facility: CLINIC | Age: 82
End: 2022-08-16

## 2022-08-16 VITALS
WEIGHT: 145 LBS | BODY MASS INDEX: 25.69 KG/M2 | DIASTOLIC BLOOD PRESSURE: 79 MMHG | RESPIRATION RATE: 16 BRPM | TEMPERATURE: 97.4 F | OXYGEN SATURATION: 94 % | SYSTOLIC BLOOD PRESSURE: 121 MMHG | HEIGHT: 63 IN | HEART RATE: 67 BPM

## 2022-08-16 DIAGNOSIS — E55.9 VITAMIN D INSUFFICIENCY: ICD-10-CM

## 2022-08-16 DIAGNOSIS — E78.2 MIXED HYPERLIPIDEMIA: ICD-10-CM

## 2022-08-16 DIAGNOSIS — R82.90 ABNORMAL URINALYSIS: Primary | ICD-10-CM

## 2022-08-16 DIAGNOSIS — G47.33 OBSTRUCTIVE SLEEP APNEA SYNDROME: ICD-10-CM

## 2022-08-16 DIAGNOSIS — N18.30 STAGE 3 CHRONIC KIDNEY DISEASE, UNSPECIFIED WHETHER STAGE 3A OR 3B CKD: ICD-10-CM

## 2022-08-16 DIAGNOSIS — M85.89 OSTEOPENIA OF MULTIPLE SITES: ICD-10-CM

## 2022-08-16 DIAGNOSIS — R53.83 FATIGUE, UNSPECIFIED TYPE: ICD-10-CM

## 2022-08-16 DIAGNOSIS — K21.9 GASTROESOPHAGEAL REFLUX DISEASE WITHOUT ESOPHAGITIS: ICD-10-CM

## 2022-08-16 DIAGNOSIS — K58.1 IRRITABLE BOWEL SYNDROME WITH CONSTIPATION: ICD-10-CM

## 2022-08-16 DIAGNOSIS — F41.9 ANXIETY: ICD-10-CM

## 2022-08-16 DIAGNOSIS — J30.9 ALLERGIC RHINITIS, UNSPECIFIED SEASONALITY, UNSPECIFIED TRIGGER: ICD-10-CM

## 2022-08-16 LAB
BILIRUB BLD-MCNC: NEGATIVE MG/DL
CLARITY, POC: CLEAR
COLOR UR: YELLOW
EXPIRATION DATE: ABNORMAL
GLUCOSE UR STRIP-MCNC: NEGATIVE MG/DL
KETONES UR QL: NEGATIVE
LEUKOCYTE EST, POC: ABNORMAL
Lab: ABNORMAL
NITRITE UR-MCNC: NEGATIVE MG/ML
PH UR: 7 [PH] (ref 5–8)
PROT UR STRIP-MCNC: NEGATIVE MG/DL
RBC # UR STRIP: NEGATIVE /UL
SP GR UR: 1.01 (ref 1–1.03)
UROBILINOGEN UR QL: NORMAL

## 2022-08-16 PROCEDURE — 99214 OFFICE O/P EST MOD 30 MIN: CPT

## 2022-08-16 PROCEDURE — 81003 URINALYSIS AUTO W/O SCOPE: CPT

## 2022-08-16 RX ORDER — RISEDRONATE SODIUM 150 MG/1
150 TABLET, FILM COATED ORAL
Qty: 3 TABLET | Refills: 3 | Status: SHIPPED | OUTPATIENT
Start: 2022-08-16 | End: 2022-10-18 | Stop reason: SDUPTHER

## 2022-08-16 NOTE — ASSESSMENT & PLAN NOTE
Avoid eating or drinking approximately three hours before bed.  Avoid tobacco use, spicy foods, alcohol, caffeinated beverages or other foods that irritate GERD.  Sleep elevated 10-15 degrees.  Take medication as directed (if ordered).

## 2022-08-16 NOTE — PROGRESS NOTES
Subjective   Elizabeth Evans is a 82 y.o. female.     Chief Complaint   Patient presents with   • Hyperlipidemia       Patient is a very pleasant 82-year-old patient here for medication refill.    History of hyperlipidemia, chronic kidney disease, seasonal allergies, dry eyes, hearing loss, GERD, sleep apnea, and osteoporosis.     History of anxiety and depression, well controlled.  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    History of chronic constipation, denies Cologuard and colonoscopy at this time.  She feels like constipation is well controlled with MiraLAX.  Hi advised her to increase her diet with fruits fruits vegetables water and use stool softener in lieu of chronic use of MiraLAX.  She states that she will think about it and agrees that she probably is not drinking enough water.    Patient recently saw her ophthalmologist and has eyedrops for dry eyes.    Hearing loss is controlled with a hearing aid in the right ear.  Stable    History of kidney disease.  Will readdress with recent lab results.  Evaluation and referral to nephrology if needed.  Discussed avoiding nephrotoxic medications.    History of osteopenia and her risedronate has been refilled.    History of GERD, controlled with over-the-counter medications.  Discussed avoiding triggers to exacerbate GERD.    Patient reports that her sleep apnea machine has been recalled.  She states she sleeps well and is not currently using her machine.    She would like referral to a new sleep specialist after the beginning of the year.    Routine labs are drawn, will discuss at next visit.            The following portions of the patient's history were reviewed and updated as appropriate: allergies, current medications, past family history, past medical history, past social history, past surgical history and problem list.  chart review    Allergies:  No Known Allergies    Social History:  Social History      Socioeconomic History   • Marital status:    Tobacco Use   • Smoking status: Never Smoker   • Smokeless tobacco: Never Used   Vaping Use   • Vaping Use: Never used   Substance and Sexual Activity   • Alcohol use: No   • Drug use: No   • Sexual activity: Defer       Family History:  Family History   Problem Relation Age of Onset   • Heart disease Mother    • Diabetes Mother    • Heart disease Father        Past Medical History :  Patient Active Problem List   Diagnosis   • Allergic rhinitis   • Chronic kidney disease, stage III (moderate) ([facility])   • Gastroesophageal reflux disease   • Hand paresthesia   • Hearing loss   • Hematuria, unspecified   • Hemochromatosis   • Hiatal hernia   • Hyperlipidemia   • Irritable bowel syndrome   • Mitral regurgitation   • Multiple pulmonary nodules   • Osteopenia of multiple sites   • Pulmonary emphysema ([facility])   • Recurrent isolated sleep paralysis   • Rosacea   • Seasonal affective disorder ([facility])   • Obstructive sleep apnea syndrome   • Abnormal CT scan, esophagus   • Herpetic lesions   • History of ehrlichiosis   • Atrophic vaginitis   • Dry eye syndrome   • Female cystocele   • Esophageal abnormality   • Herpes simplex without complication   • Anxiety   • Fatigue   • Vitamin D insufficiency       Medication List:  Outpatient Encounter Medications as of 8/16/2022   Medication Sig Dispense Refill   • calcium-vitamin D (OSCAL-500) 500-200 MG-UNIT per tablet Take 1 tablet by mouth Daily.     • cetirizine (zyrTEC) 10 MG tablet Take 10 mg by mouth As Needed.     • esomeprazole (nexIUM) 20 MG capsule Take 20 mg by mouth Every Morning Before Breakfast.     • polyethylene glycol (MIRALAX) 17 GM/SCOOP powder Take 17 g by mouth Daily.     • Restasis 0.05 % ophthalmic emulsion INSTILL 1 DROPS INTO AFFECTED EYE EVERY 12 HOURS     • risedronate (ACTONEL) 150 MG tablet Take 1 tablet by mouth Every 30 (Thirty) Days. with water on empty stomach, nothing by mouth or lie down for next 30  minutes. 3 tablet 3   • [DISCONTINUED] risedronate (ACTONEL) 150 MG tablet Take 1 tablet by mouth Every 30 (Thirty) Days. with water on empty stomach, nothing by mouth or lie down for next 30 minutes. 3 tablet 3   • [DISCONTINUED] Omega-3 Fatty Acids (FISH OIL) 1000 MG capsule capsule FISH OIL 1000 MG CAPS       No facility-administered encounter medications on file as of 8/16/2022.       Past Surgical History:  Past Surgical History:   Procedure Laterality Date   • BREAST BIOPSY Right     Benign   • CATARACT EXTRACTION         Review of Systems:  Review of Systems   Constitutional: Negative for activity change, appetite change, chills, diaphoresis, fatigue, fever, unexpected weight gain and unexpected weight loss.   HENT: Positive for hearing loss (Right ear hearing aid). Negative for trouble swallowing and voice change.    Eyes: Negative for visual disturbance (dry eye. Managed by opth and eye drops.).   Respiratory: Negative for cough, choking, chest tightness, shortness of breath and wheezing.    Cardiovascular: Negative for chest pain and palpitations.   Gastrointestinal: Positive for constipation and GERD. Negative for abdominal distention, abdominal pain, anal bleeding, blood in stool, diarrhea, nausea, rectal pain and indigestion.   Endocrine: Negative for cold intolerance, heat intolerance, polydipsia and polyphagia.   Genitourinary: Negative for decreased urine volume, flank pain, hematuria, pelvic pain, urinary incontinence, vaginal bleeding, vaginal discharge and vaginal pain.   Musculoskeletal: Negative for back pain, joint swelling and neck stiffness.   Skin: Positive for dry skin. Negative for color change, pallor and skin lesions.   Allergic/Immunologic: Positive for environmental allergies. Negative for food allergies and immunocompromised state.   Neurological: Negative for dizziness, seizures, syncope, memory problem and confusion.   Hematological: Negative for adenopathy.  "  Psychiatric/Behavioral: Negative for agitation, decreased concentration, self-injury, sleep disturbance, suicidal ideas, depressed mood and stress. The patient is not nervous/anxious.        I have reviewed and confirmed the accuracy of the HPI and ROS as documented by the MA/LPN/RN JESSICA English    Vital Signs:  Visit Vitals  /79 (BP Location: Right arm, Patient Position: Sitting, Cuff Size: Adult)   Pulse 67   Temp 97.4 °F (36.3 °C) (Infrared)   Resp 16   Ht 160 cm (63\")   Wt 65.8 kg (145 lb)   SpO2 94%   BMI 25.69 kg/m²       Physical Exam  Vitals and nursing note reviewed.   Constitutional:       General: She is not in acute distress.     Appearance: Normal appearance. She is well-groomed and normal weight.   HENT:      Head: Normocephalic.      Right Ear: Tympanic membrane, ear canal and external ear normal. There is no impacted cerumen.      Left Ear: Tympanic membrane, ear canal and external ear normal. There is no impacted cerumen.      Nose: Nose normal.      Right Nostril: No foreign body.      Left Nostril: No foreign body.      Right Turbinates: Not enlarged or swollen.      Left Turbinates: Not enlarged or swollen.      Right Sinus: No maxillary sinus tenderness or frontal sinus tenderness.      Left Sinus: No maxillary sinus tenderness or frontal sinus tenderness.      Mouth/Throat:      Mouth: Mucous membranes are moist.   Eyes:      General: Lids are normal. Vision grossly intact.      Extraocular Movements: Extraocular movements intact.      Conjunctiva/sclera: Conjunctivae normal.      Pupils: Pupils are equal, round, and reactive to light.   Neck:      Thyroid: No thyroid mass, thyromegaly or thyroid tenderness.   Cardiovascular:      Rate and Rhythm: Normal rate and regular rhythm.      Pulses: Normal pulses.      Heart sounds: Normal heart sounds.   Pulmonary:      Effort: Pulmonary effort is normal.      Breath sounds: Normal breath sounds.   Abdominal:      General: Abdomen " is flat. Bowel sounds are normal.      Palpations: Abdomen is soft. Abdomen is not rigid.      Tenderness: There is no right CVA tenderness or left CVA tenderness.   Musculoskeletal:         General: Normal range of motion.      Cervical back: Normal range of motion and neck supple.      Right lower leg: No edema.      Left lower leg: No edema.   Lymphadenopathy:      Cervical: No cervical adenopathy.   Skin:     General: Skin is warm and dry.      Capillary Refill: Capillary refill takes less than 2 seconds.      Comments: No abnormalities noted, but does have solar age spots.     Neurological:      Mental Status: She is alert and oriented to person, place, and time.   Psychiatric:         Attention and Perception: Attention and perception normal.         Mood and Affect: Mood normal. Mood is not depressed.         Speech: Speech normal.         Behavior: Behavior normal. Behavior is cooperative.         Thought Content: Thought content normal.         Cognition and Memory: Cognition normal.         Assessment and Plan:  Problem List Items Addressed This Visit        Allergies and Adverse Reactions    Allergic rhinitis    Current Assessment & Plan     Stable with over-the-counter medication use            Cardiac and Vasculature    Hyperlipidemia    Current Assessment & Plan     Lipid levels will be checked.  Continue diet, lifestyle and medication         Relevant Orders    Lipid Panel       Endocrine and Metabolic    Vitamin D insufficiency    Current Assessment & Plan     We will check vitamin D level.          Relevant Orders    Vitamin D 25 Hydroxy       Gastrointestinal Abdominal     Gastroesophageal reflux disease    Overview     Dr. Carrera         Current Assessment & Plan     Avoid eating or drinking approximately three hours before bed.  Avoid tobacco use, spicy foods, alcohol, caffeinated beverages or other foods that irritate GERD.  Sleep elevated 10-15 degrees.  Take medication as directed (if  ordered).           Irritable bowel syndrome    Overview     Dr. Carrera         Current Assessment & Plan     Controlled with miralax and diet.    Discussed hydration.              Genitourinary and Reproductive     Chronic kidney disease, stage III (moderate) ([facility])    Current Assessment & Plan     Will monitor CMP CBC and urinalysis.  Referral needed.  Discussed avoiding NSAIDs and other renal toxic medications.         Relevant Orders    POC Urinalysis Dipstick, Automated (Completed)    CBC & Differential    Comprehensive Metabolic Panel       Mental Health    Anxiety    Current Assessment & Plan     Feels stable.  Previously worked as a teacher.  No concerns today.            Musculoskeletal and Injuries    Osteopenia of multiple sites    Overview     With high fracture risk         Current Assessment & Plan     Continue medication.  Continue over-the-counter calcium supplement.  DEXA scan due.         Relevant Medications    risedronate (ACTONEL) 150 MG tablet    Other Relevant Orders    DEXA Bone Density Axial       Sleep    Obstructive sleep apnea syndrome    Overview     Dr. Green         Current Assessment & Plan     Recommended by  - Needs new machine -  Will refer to new sleep medicine at beginning of year per patient.  Wants a second advice.             Symptoms and Signs    Fatigue    Current Assessment & Plan     Encouraged pressure and vegetable diet, exercise, good sleep, will check vitamin D and thyroid levels.         Relevant Orders    TSH Rfx On Abnormal To Free T4    Vitamin B12      Other Visit Diagnoses     Abnormal urinalysis    -  Primary    Relevant Orders    POC Urinalysis Dipstick, Automated (Completed)          An After Visit Summary and PPPS were given to the patient.       I wore protective equipment throughout this patient encounter to include mask. Hand hygiene was performed before donning protective equipment and after removal when leaving the room.    Findings  discussed.  All questions answered.

## 2022-08-16 NOTE — ASSESSMENT & PLAN NOTE
Recommended by  - Needs new machine -  Will refer to new sleep medicine at beginning of year per patient.  Wants a second advice.

## 2022-08-16 NOTE — ASSESSMENT & PLAN NOTE
Will monitor CMP CBC and urinalysis.  Referral needed.  Discussed avoiding NSAIDs and other renal toxic medications.

## 2022-08-16 NOTE — ASSESSMENT & PLAN NOTE
Encouraged pressure and vegetable diet, exercise, good sleep, will check vitamin D and thyroid levels.

## 2022-08-16 NOTE — PATIENT INSTRUCTIONS
Continue current plan of care as discussed.   Take medication as ordered (if applicable).    Practice good sleep hygiene.  Eat a well balanced diet with fresh fruit and vegetables.    Stay hydrated, drink water daily.      Limit sweetened beverages, sodas, juices.    Bake, boil, broil or grill your food, avoid eating fried foods.   Regular exercise is important.  Incorporate weight or resistance into your routine.

## 2022-08-19 LAB
25(OH)D3+25(OH)D2 SERPL-MCNC: 35.6 NG/ML (ref 30–100)
ALBUMIN SERPL-MCNC: 4.5 G/DL (ref 3.6–4.6)
ALBUMIN/GLOB SERPL: 2.1 {RATIO} (ref 1.2–2.2)
ALP SERPL-CCNC: 56 IU/L (ref 44–121)
ALT SERPL-CCNC: 14 IU/L (ref 0–32)
AST SERPL-CCNC: 26 IU/L (ref 0–40)
BASOPHILS # BLD AUTO: 0.1 X10E3/UL (ref 0–0.2)
BASOPHILS NFR BLD AUTO: 1 %
BILIRUB SERPL-MCNC: 0.6 MG/DL (ref 0–1.2)
BUN SERPL-MCNC: 14 MG/DL (ref 8–27)
BUN/CREAT SERPL: 13 (ref 12–28)
CALCIUM SERPL-MCNC: 9.8 MG/DL (ref 8.7–10.3)
CHLORIDE SERPL-SCNC: 103 MMOL/L (ref 96–106)
CHOLEST SERPL-MCNC: 243 MG/DL (ref 100–199)
CO2 SERPL-SCNC: 26 MMOL/L (ref 20–29)
CREAT SERPL-MCNC: 1.04 MG/DL (ref 0.57–1)
EGFRCR-CYS SERPLBLD CKD-EPI 2021: 53 ML/MIN/1.73
EOSINOPHIL # BLD AUTO: 0.2 X10E3/UL (ref 0–0.4)
EOSINOPHIL NFR BLD AUTO: 5 %
ERYTHROCYTE [DISTWIDTH] IN BLOOD BY AUTOMATED COUNT: 13.5 % (ref 11.7–15.4)
GLOBULIN SER CALC-MCNC: 2.1 G/DL (ref 1.5–4.5)
GLUCOSE SERPL-MCNC: 95 MG/DL (ref 65–99)
HCT VFR BLD AUTO: 47.7 % (ref 34–46.6)
HDLC SERPL-MCNC: 48 MG/DL
HGB BLD-MCNC: 15.8 G/DL (ref 11.1–15.9)
IMM GRANULOCYTES # BLD AUTO: 0 X10E3/UL (ref 0–0.1)
IMM GRANULOCYTES NFR BLD AUTO: 0 %
LDLC SERPL CALC-MCNC: 158 MG/DL (ref 0–99)
LYMPHOCYTES # BLD AUTO: 1.9 X10E3/UL (ref 0.7–3.1)
LYMPHOCYTES NFR BLD AUTO: 37 %
MCH RBC QN AUTO: 31.2 PG (ref 26.6–33)
MCHC RBC AUTO-ENTMCNC: 33.1 G/DL (ref 31.5–35.7)
MCV RBC AUTO: 94 FL (ref 79–97)
MONOCYTES # BLD AUTO: 0.4 X10E3/UL (ref 0.1–0.9)
MONOCYTES NFR BLD AUTO: 7 %
NEUTROPHILS # BLD AUTO: 2.6 X10E3/UL (ref 1.4–7)
NEUTROPHILS NFR BLD AUTO: 50 %
PLATELET # BLD AUTO: 200 X10E3/UL (ref 150–450)
POTASSIUM SERPL-SCNC: 4.6 MMOL/L (ref 3.5–5.2)
PROT SERPL-MCNC: 6.6 G/DL (ref 6–8.5)
RBC # BLD AUTO: 5.07 X10E6/UL (ref 3.77–5.28)
SODIUM SERPL-SCNC: 142 MMOL/L (ref 134–144)
TRIGL SERPL-MCNC: 200 MG/DL (ref 0–149)
TSH SERPL DL<=0.005 MIU/L-ACNC: 4.36 UIU/ML (ref 0.45–4.5)
VIT B12 SERPL-MCNC: 360 PG/ML (ref 232–1245)
VLDLC SERPL CALC-MCNC: 37 MG/DL (ref 5–40)
WBC # BLD AUTO: 5.1 X10E3/UL (ref 3.4–10.8)

## 2022-08-20 NOTE — PROGRESS NOTES
Labwork is good with exception of cholesterol. Using the ASCVD Risk  (limited to age 77, but I recommend the guidelines) her 10 year risk of heart attack or stroke is 21.2%, optimal risk is 18.4%.  We should discuss her diet consistency and limiting high fat or fried foods including steak, marquez, butter, red meat, pastries, donuts and other foods high in transfats.  I recommend she eat a well balanced diet with fresh fruit and vegetables, bake, boil, broil or grill your food, avoid eating fried foods.  Continue fish oil daily.      Please consider adding a cholesterol lowering medication like atorvastatin 20mg daily to lower your LDL.        We will discuss at length at next appt.

## 2022-08-22 ENCOUNTER — HOSPITAL ENCOUNTER (OUTPATIENT)
Dept: BONE DENSITY | Facility: HOSPITAL | Age: 82
Discharge: HOME | End: 2022-08-22

## 2022-08-22 PROCEDURE — 77080 DXA BONE DENSITY AXIAL: CPT

## 2022-10-18 ENCOUNTER — OFFICE VISIT (OUTPATIENT)
Dept: FAMILY MEDICINE CLINIC | Facility: CLINIC | Age: 82
End: 2022-10-18

## 2022-10-18 VITALS
HEIGHT: 63 IN | RESPIRATION RATE: 16 BRPM | SYSTOLIC BLOOD PRESSURE: 131 MMHG | HEART RATE: 74 BPM | WEIGHT: 140 LBS | TEMPERATURE: 97.3 F | DIASTOLIC BLOOD PRESSURE: 78 MMHG | OXYGEN SATURATION: 96 % | BODY MASS INDEX: 24.8 KG/M2

## 2022-10-18 DIAGNOSIS — E78.49 OTHER HYPERLIPIDEMIA: Primary | ICD-10-CM

## 2022-10-18 DIAGNOSIS — M85.89 OSTEOPENIA OF MULTIPLE SITES: ICD-10-CM

## 2022-10-18 DIAGNOSIS — N18.30 STAGE 3 CHRONIC KIDNEY DISEASE, UNSPECIFIED WHETHER STAGE 3A OR 3B CKD: ICD-10-CM

## 2022-10-18 PROCEDURE — 1159F MED LIST DOCD IN RCRD: CPT

## 2022-10-18 PROCEDURE — G0439 PPPS, SUBSEQ VISIT: HCPCS

## 2022-10-18 PROCEDURE — 1170F FXNL STATUS ASSESSED: CPT

## 2022-10-18 RX ORDER — RISEDRONATE SODIUM 150 MG/1
150 TABLET, FILM COATED ORAL
Qty: 3 TABLET | Refills: 3 | Status: SHIPPED | OUTPATIENT
Start: 2022-10-18

## 2022-10-18 NOTE — ASSESSMENT & PLAN NOTE
Patient reports stopping butter, stopping salt in her diet - reports major change in diet and lifestyle interventions. Stopping sweets and trans fats in her diet. Reports very little caffeine, occasional green tea for breakfast, two quarts of water a day, drinking rice milk instead of cow's milk.     Reports she wasn't drinking enough water at the last visit.

## 2022-10-18 NOTE — PROGRESS NOTES
The ABCs of the Annual Wellness Visit  Subsequent Medicare Wellness Visit    Chief Complaint   Patient presents with   • Medicare Wellness-subsequent      Subjective    History of Present Illness:  Elizabeth Evans is a 82 y.o. female who presents for a Subsequent Medicare Wellness Visit.    The following portions of the patient's history were reviewed and   updated as appropriate: allergies, current medications, past family history, past medical history, past social history, past surgical history and problem list.    Compared to one year ago, the patient feels her physical   health is better.    Compared to one year ago, the patient feels her mental   health is better.    Recent Hospitalizations:  She was not admitted to the hospital during the last year.       Current Medical Providers:  Patient Care Team:  Allison Frost APRN as PCP - General (Nurse Practitioner)  Connor Carrera MD as Consulting Physician (Gastroenterology)  Patricio Green DO as Consulting Physician (Internal Medicine)  Leonides Mata MD as Consulting Physician (Ophthalmology)    Outpatient Medications Prior to Visit   Medication Sig Dispense Refill   • calcium-vitamin D (OSCAL-500) 500-200 MG-UNIT per tablet Take 1 tablet by mouth Daily.     • cetirizine (zyrTEC) 10 MG tablet Take 10 mg by mouth As Needed.     • esomeprazole (nexIUM) 20 MG capsule Take 20 mg by mouth Every Morning Before Breakfast.     • polyethylene glycol (MIRALAX) 17 GM/SCOOP powder Take 17 g by mouth Daily.     • Restasis 0.05 % ophthalmic emulsion INSTILL 1 DROPS INTO AFFECTED EYE EVERY 12 HOURS     • risedronate (ACTONEL) 150 MG tablet Take 1 tablet by mouth Every 30 (Thirty) Days. with water on empty stomach, nothing by mouth or lie down for next 30 minutes. 3 tablet 3     No facility-administered medications prior to visit.       No opioid medication identified on active medication list. I have reviewed chart for other potential  high risk  medication/s and harmful drug interactions in the elderly.          Aspirin is not on active medication list.  Aspirin use is not indicated based on review of current medical condition/s. Risk of harm outweighs potential benefits.  .    Patient Active Problem List   Diagnosis   • Allergic rhinitis   • Chronic kidney disease, stage III (moderate) ([facility])   • Gastroesophageal reflux disease   • Hand paresthesia   • Hearing loss   • Hematuria, unspecified   • Hemochromatosis   • Hiatal hernia   • Hyperlipidemia   • Irritable bowel syndrome   • Mitral regurgitation   • Multiple pulmonary nodules   • Osteopenia of multiple sites   • Pulmonary emphysema ([facility])   • Recurrent isolated sleep paralysis   • Rosacea   • Seasonal affective disorder (HCC)   • Obstructive sleep apnea syndrome   • Abnormal CT scan, esophagus   • Herpetic lesions   • History of ehrlichiosis   • Atrophic vaginitis   • Dry eye syndrome   • Female cystocele   • Esophageal abnormality   • Herpes simplex without complication   • Anxiety   • Fatigue   • Vitamin D insufficiency   • Glaucoma suspect of both eyes   • Age-related nuclear cataract of both eyes     Advance Care Planning  Advance Directive is on file.  ACP discussion was held with the patient during this visit. Patient has an advance directive in EMR which is still valid.     Review of Systems   Constitutional: Positive for activity change and appetite change. Negative for chills, fatigue and fever.   HENT: Positive for hearing loss. Negative for congestion and sore throat.    Eyes: Positive for visual disturbance (dry eyes).   Respiratory: Negative for cough and shortness of breath.    Cardiovascular: Negative for chest pain, palpitations and leg swelling.   Gastrointestinal: Negative for constipation, diarrhea, nausea and vomiting.   Endocrine: Negative for cold intolerance and heat intolerance.   Genitourinary: Positive for urgency (stress incontinence). Negative for difficulty urinating.  "  Musculoskeletal: Negative for arthralgias.   Skin:        Dry skin     Allergic/Immunologic: Positive for environmental allergies.   Neurological: Positive for dizziness.   Psychiatric/Behavioral: Negative for decreased concentration, self-injury, sleep disturbance and suicidal ideas. The patient is not nervous/anxious.         Objective    Vitals:    10/18/22 0951   BP: 131/78   BP Location: Left arm   Patient Position: Sitting   Cuff Size: Adult   Pulse: 74   Resp: 16   Temp: 97.3 °F (36.3 °C)   TempSrc: Infrared   SpO2: 96%   Weight: 63.5 kg (140 lb)   Height: 160 cm (63\")     Estimated body mass index is 24.8 kg/m² as calculated from the following:    Height as of this encounter: 160 cm (63\").    Weight as of this encounter: 63.5 kg (140 lb).    BMI is >= 25 and <30. (Overweight) The following options were offered after discussion;: weight loss educational material (shared in after visit summary), exercise counseling/recommendations and nutrition counseling/recommendations      Does the patient have evidence of cognitive impairment? No    Physical Exam  Vitals and nursing note reviewed.   Constitutional:       General: She is not in acute distress.     Appearance: Normal appearance. She is well-groomed and normal weight.   Cardiovascular:      Rate and Rhythm: Normal rate and regular rhythm.      Pulses: Normal pulses.      Heart sounds: Normal heart sounds.   Pulmonary:      Effort: Pulmonary effort is normal.      Breath sounds: Normal breath sounds.   Abdominal:      General: Abdomen is flat. Bowel sounds are normal.      Palpations: Abdomen is soft.      Tenderness: There is no abdominal tenderness.   Skin:     General: Skin is warm and dry.      Capillary Refill: Capillary refill takes less than 2 seconds.   Neurological:      General: No focal deficit present.      Mental Status: She is alert and oriented to person, place, and time.   Psychiatric:         Attention and Perception: Attention and " perception normal.         Mood and Affect: Mood and affect normal.         Speech: Speech normal.         Behavior: Behavior normal. Behavior is cooperative.         Thought Content: Thought content normal.         Cognition and Memory: Cognition and memory normal.         Judgment: Judgment normal.       Lab Results   Component Value Date    CHLPL 243 (H) 08/18/2022    TRIG 200 (H) 08/18/2022    HDL 48 08/18/2022     (H) 08/18/2022    VLDL 37 08/18/2022            HEALTH RISK ASSESSMENT    Smoking Status:  Social History     Tobacco Use   Smoking Status Never   Smokeless Tobacco Never     Alcohol Consumption:  Social History     Substance and Sexual Activity   Alcohol Use No     Fall Risk Screen:    STEADI Fall Risk Assessment was completed, and patient is at LOW risk for falls.Assessment completed on:10/18/2022    Depression Screening:  PHQ-2/PHQ-9 Depression Screening 10/18/2022   Retired PHQ-9 Total Score -   Retired Total Score -   Little Interest or Pleasure in Doing Things 0-->not at all   Feeling Down, Depressed or Hopeless 0-->not at all   PHQ-9: Brief Depression Severity Measure Score 0       Health Habits and Functional and Cognitive Screening:  Functional & Cognitive Status 10/18/2022   Do you have difficulty preparing food and eating? No   Do you have difficulty bathing yourself, getting dressed or grooming yourself? No   Do you have difficulty using the toilet? No   Do you have difficulty moving around from place to place? No   Do you have trouble with steps or getting out of a bed or a chair? No   Current Diet Well Balanced Diet   Dental Exam Up to date   Eye Exam Up to date   Exercise (times per week) 4 times per week   Current Exercises Include Yard Work;Walking   Current Exercise Activities Include -   Do you need help using the phone?  No   Are you deaf or do you have serious difficulty hearing?  Yes   Do you need help with transportation? No   Do you need help shopping? No   Do you need  help preparing meals?  No   Do you need help with housework?  No   Do you need help with laundry? No   Do you need help taking your medications? No   Do you need help managing money? No   Do you ever drive or ride in a car without wearing a seat belt? No   Have you felt unusual stress, anger or loneliness in the last month? No   Who do you live with? Spouse   If you need help, do you have trouble finding someone available to you? No   Have you been bothered in the last four weeks by sexual problems? No   Do you have difficulty concentrating, remembering or making decisions? Yes       Age-appropriate Screening Schedule:  Refer to the list below for future screening recommendations based on patient's age, sex and/or medical conditions. Orders for these recommended tests are listed in the plan section. The patient has been provided with a written plan.    Health Maintenance   Topic Date Due   • LIPID PANEL  08/18/2023   • DXA SCAN  08/22/2024   • TDAP/TD VACCINES (2 - Td or Tdap) 05/30/2029   • INFLUENZA VACCINE  Completed   • ZOSTER VACCINE  Discontinued              Assessment & Plan   CMS Preventative Services Quick Reference  Risk Factors Identified During Encounter  Cardiovascular Disease  Immunizations Discussed/Encouraged (specific Immunizations; Influenza, Prevnar 20 (Pneumococcal 20-valent conjugate), Shingrix and COVID19  Urinary Incontinence  The above risks/problems have been discussed with the patient.  Follow up actions/plans if indicated are seen below in the Assessment/Plan Section.  Pertinent information has been shared with the patient in the After Visit Summary.    Diagnoses and all orders for this visit:    1. Other hyperlipidemia (Primary)  Assessment & Plan:  Patient reports stopping butter, stopping salt in her diet - reports major change in diet and lifestyle interventions. Stopping sweets and trans fats in her diet. Reports very little caffeine, occasional green tea for breakfast, two quarts of  water a day, drinking rice milk instead of cow's milk.     Reports she wasn't drinking enough water at the last visit.     Taking 500mg omega red supplement.  Working on diet and lifestyle. Will address at next appt.  Labs to be drawn in one month.     Orders:  -     Lipid panel; Future  -     TSH; Future  -     T4, free; Future    2. Stage 3 chronic kidney disease, unspecified whether stage 3a or 3b CKD (HCC)  Assessment & Plan:  Patient reports stopping butter, stopping salt in her diet - reports major change in diet and lifestyle interventions. Stopping sweets and trans fats in her diet. Reports very little caffeine, occasional green tea for breakfast, two quarts of water a day, drinking rice milk instead of cow's milk.     Reports she wasn't drinking enough water at the last visit.       3. Osteopenia of multiple sites  -     risedronate (ACTONEL) 150 MG tablet; Take 1 tablet by mouth Every 30 (Thirty) Days. with water on empty stomach, nothing by mouth or lie down for next 30 minutes.  Dispense: 3 tablet; Refill: 3      Follow Up:   Return 6 months, for Next scheduled follow up.     An After Visit Summary and PPPS were made available to the patient.

## 2022-10-18 NOTE — ASSESSMENT & PLAN NOTE
Patient reports stopping butter, stopping salt in her diet - reports major change in diet and lifestyle interventions. Stopping sweets and trans fats in her diet. Reports very little caffeine, occasional green tea for breakfast, two quarts of water a day, drinking rice milk instead of cow's milk.     Reports she wasn't drinking enough water at the last visit.     Taking 500mg omega red supplement.  Working on diet and lifestyle. Will address at next appt.  Labs to be drawn in one month.

## 2022-11-16 DIAGNOSIS — N18.30 STAGE 3 CHRONIC KIDNEY DISEASE, UNSPECIFIED WHETHER STAGE 3A OR 3B CKD: Primary | ICD-10-CM

## 2022-11-17 LAB
ALBUMIN SERPL-MCNC: 5 G/DL (ref 3.6–4.6)
ALBUMIN/GLOB SERPL: 2.1 {RATIO} (ref 1.2–2.2)
ALP SERPL-CCNC: 69 IU/L (ref 44–121)
ALT SERPL-CCNC: 15 IU/L (ref 0–32)
AST SERPL-CCNC: 29 IU/L (ref 0–40)
BILIRUB SERPL-MCNC: 0.6 MG/DL (ref 0–1.2)
BUN SERPL-MCNC: 13 MG/DL (ref 8–27)
BUN/CREAT SERPL: 14 (ref 12–28)
CALCIUM SERPL-MCNC: 10.1 MG/DL (ref 8.7–10.3)
CHLORIDE SERPL-SCNC: 102 MMOL/L (ref 96–106)
CO2 SERPL-SCNC: 25 MMOL/L (ref 20–29)
CREAT SERPL-MCNC: 0.96 MG/DL (ref 0.57–1)
EGFRCR SERPLBLD CKD-EPI 2021: 58 ML/MIN/1.73
GLOBULIN SER CALC-MCNC: 2.4 G/DL (ref 1.5–4.5)
GLUCOSE SERPL-MCNC: 99 MG/DL (ref 70–99)
POTASSIUM SERPL-SCNC: 4.5 MMOL/L (ref 3.5–5.2)
PROT SERPL-MCNC: 7.4 G/DL (ref 6–8.5)
SODIUM SERPL-SCNC: 143 MMOL/L (ref 134–144)

## 2022-11-22 ENCOUNTER — TELEPHONE (OUTPATIENT)
Dept: FAMILY MEDICINE CLINIC | Facility: CLINIC | Age: 82
End: 2022-11-22

## 2022-11-22 NOTE — TELEPHONE ENCOUNTER
Caller: Elizabeth Evans    Relationship: Self    Best call back number: [phone]    What test was performed: BLOODWORK    When was the test performed: 11/17    Where was the test performed: IN OFFICE    Additional notes: PLEASE CALL PATIENT TO REVIEW TEST RESULTS

## 2022-11-24 DIAGNOSIS — R94.4 DECREASED GFR: Primary | ICD-10-CM

## 2022-12-04 DIAGNOSIS — E78.00 ELEVATED LDL CHOLESTEROL LEVEL: Primary | ICD-10-CM

## 2022-12-04 RX ORDER — ATORVASTATIN CALCIUM 20 MG/1
20 TABLET, FILM COATED ORAL DAILY
Qty: 90 TABLET | Refills: 1 | Status: SHIPPED | OUTPATIENT
Start: 2022-12-04

## 2022-12-05 ENCOUNTER — LAB (OUTPATIENT)
Dept: FAMILY MEDICINE CLINIC | Facility: CLINIC | Age: 82
End: 2022-12-05

## 2022-12-05 DIAGNOSIS — R82.90 ABNORMAL URINALYSIS: ICD-10-CM

## 2022-12-05 DIAGNOSIS — R94.4 DECREASED GFR: Primary | ICD-10-CM

## 2022-12-05 LAB
BILIRUB BLD-MCNC: NEGATIVE MG/DL
CLARITY, POC: CLEAR
COLOR UR: YELLOW
EXPIRATION DATE: ABNORMAL
GLUCOSE UR STRIP-MCNC: NEGATIVE MG/DL
KETONES UR QL: NEGATIVE
LEUKOCYTE EST, POC: ABNORMAL
Lab: ABNORMAL
NITRITE UR-MCNC: NEGATIVE MG/ML
PH UR: 8.5 [PH] (ref 5–8)
PROT UR STRIP-MCNC: NEGATIVE MG/DL
RBC # UR STRIP: NEGATIVE /UL
SP GR UR: 1.01 (ref 1–1.03)
UROBILINOGEN UR QL: NORMAL

## 2022-12-05 PROCEDURE — 81003 URINALYSIS AUTO W/O SCOPE: CPT

## 2022-12-06 ENCOUNTER — TELEPHONE (OUTPATIENT)
Dept: FAMILY MEDICINE CLINIC | Facility: CLINIC | Age: 82
End: 2022-12-06

## 2022-12-06 NOTE — PROGRESS NOTES
Urine ph is alkaline with white blood cells.  Do you consume a lot of fresh fruits vegetables and cereals? Any symptoms of a urinary tract infection or kidney pain? Flank pain

## 2022-12-06 NOTE — TELEPHONE ENCOUNTER
HUB TO SHARE  Urine ph is alkaline with white blood cells.  Do you consume a lot of fresh fruits vegetables and cereals? Any symptoms of a urinary tract infection or kidney pain? Flank pain

## 2022-12-07 LAB
BACTERIA UR CULT: NORMAL
BACTERIA UR CULT: NORMAL

## 2022-12-20 ENCOUNTER — LAB (OUTPATIENT)
Dept: FAMILY MEDICINE CLINIC | Facility: CLINIC | Age: 82
End: 2022-12-20

## 2022-12-20 DIAGNOSIS — N18.30 STAGE 3 CHRONIC KIDNEY DISEASE, UNSPECIFIED WHETHER STAGE 3A OR 3B CKD: ICD-10-CM

## 2022-12-20 DIAGNOSIS — R82.90 ABNORMAL URINALYSIS: Primary | ICD-10-CM

## 2022-12-20 LAB
BILIRUB BLD-MCNC: NEGATIVE MG/DL
CLARITY, POC: CLEAR
COLOR UR: YELLOW
EXPIRATION DATE: NORMAL
GLUCOSE UR STRIP-MCNC: NEGATIVE MG/DL
KETONES UR QL: NEGATIVE
LEUKOCYTE EST, POC: NEGATIVE
Lab: NORMAL
NITRITE UR-MCNC: NEGATIVE MG/ML
PH UR: 7 [PH] (ref 5–8)
PROT UR STRIP-MCNC: NEGATIVE MG/DL
RBC # UR STRIP: NEGATIVE /UL
SP GR UR: 1.01 (ref 1–1.03)
UROBILINOGEN UR QL: NORMAL

## 2022-12-20 PROCEDURE — 81003 URINALYSIS AUTO W/O SCOPE: CPT

## 2022-12-22 LAB
BACTERIA UR CULT: NORMAL
BACTERIA UR CULT: NORMAL

## 2023-04-18 ENCOUNTER — OFFICE VISIT (OUTPATIENT)
Dept: FAMILY MEDICINE CLINIC | Facility: CLINIC | Age: 83
End: 2023-04-18
Payer: MEDICARE

## 2023-04-18 VITALS
OXYGEN SATURATION: 96 % | HEIGHT: 63 IN | TEMPERATURE: 97.2 F | BODY MASS INDEX: 24.8 KG/M2 | HEART RATE: 60 BPM | SYSTOLIC BLOOD PRESSURE: 151 MMHG | DIASTOLIC BLOOD PRESSURE: 83 MMHG | WEIGHT: 140 LBS | RESPIRATION RATE: 16 BRPM

## 2023-04-18 DIAGNOSIS — Z13.21 ENCOUNTER FOR VITAMIN DEFICIENCY SCREENING: ICD-10-CM

## 2023-04-18 DIAGNOSIS — N18.30 STAGE 3 CHRONIC KIDNEY DISEASE, UNSPECIFIED WHETHER STAGE 3A OR 3B CKD: ICD-10-CM

## 2023-04-18 DIAGNOSIS — R03.0 ELEVATED BLOOD PRESSURE READING: ICD-10-CM

## 2023-04-18 DIAGNOSIS — Z92.89 H/O BONE DENSITY STUDY: Primary | ICD-10-CM

## 2023-04-18 DIAGNOSIS — E78.2 MIXED HYPERLIPIDEMIA: ICD-10-CM

## 2023-04-18 PROBLEM — H57.89 INFLAMMATORY DISORDER OF EYE: Status: ACTIVE | Noted: 2017-09-20

## 2023-04-18 RX ORDER — ATORVASTATIN CALCIUM 20 MG/1
20 TABLET, FILM COATED ORAL DAILY
Qty: 90 TABLET | Refills: 1 | Status: SHIPPED | OUTPATIENT
Start: 2023-04-18

## 2023-04-18 NOTE — PROGRESS NOTES
Elizabeth Evans is a 82 y.o. female. Presents to [facility] for   Chief Complaint   Patient presents with   • Hyperlipidemia       Rooming Tab(CC,VS,Pt Hx,Fall Screen)    SUBJECTIVE:    History of Present Illness   Advised patient vitamin D may or may not be covered by insurance. Patient reports understanding.     Patient Active Problem List    Diagnosis    • Fatigue [R53.83]    • Vitamin D insufficiency [E55.9]    • Anxiety [F41.9]    • Herpes simplex without complication [B00.9]    • Snoring [R06.83]    • Esophageal abnormality [K22.9]    • Herpetic lesions [B00.9]    • Atrophic vaginitis [N95.2]    • Female cystocele [N81.10]    • History of ehrlichiosis [Z86.19]    • Abnormal CT scan, esophagus [R93.3]    • Chronic kidney disease, stage III (moderate) [N18.30]    • Osteopenia of multiple sites [M85.89]    • Inflammatory disorder of eye [H57.89]    • Hematuria, unspecified [R31.9]    • Tear film insufficiency [H04.129]    • Glaucoma suspect of both eyes [H40.003]    • Age-related nuclear cataract of both eyes [H25.13]    • Allergic rhinitis [J30.9]    • Hand paresthesia [R20.2]    • Hearing loss [H91.90]    • Seasonal affective disorder [F33.8]    • Hiatal hernia [K44.9]    • Mitral regurgitation [I34.0]    • Pulmonary emphysema [J43.9]    • Rosacea [L71.9]    • Gastroesophageal reflux disease [K21.9]    • Hemochromatosis [E83.119]    • Hyperlipidemia [E78.5]    • Irritable bowel syndrome [K58.9]    • Multiple pulmonary nodules [R91.8]    • Recurrent isolated sleep paralysis [G47.53]    • EL on CPAP [G47.33, Z99.89]        Allergies   Allergen Reactions   • Levofloxacin Unknown - High Severity       Medication List:  Current Outpatient Medications on File Prior to Visit   Medication Sig Dispense Refill   • calcium-vitamin D (OSCAL-500) 500-200 MG-UNIT per tablet Take 1 tablet by mouth Daily.     • cetirizine (zyrTEC) 10 MG tablet Take 1 tablet by mouth As Needed.      • esomeprazole (nexIUM) 20 MG capsule Take 1 capsule by mouth Every Morning Before Breakfast.     • polyethylene glycol (MIRALAX) 17 GM/SCOOP powder Take 17 g by mouth Daily.     • Restasis 0.05 % ophthalmic emulsion INSTILL 1 DROPS INTO AFFECTED EYE EVERY 12 HOURS       No current facility-administered medications on file prior to visit.     Current outpatient and discharge medications have been reconciled for the patient.  Reviewed by: JESSICA Dietz      Review of Systems   Respiratory: Negative for cough, shortness of breath and wheezing.    Cardiovascular: Negative for chest pain, palpitations and leg swelling.   Allergic/Immunologic: Positive for environmental allergies.   Neurological: Negative for dizziness, headache and confusion.   Psychiatric/Behavioral: Negative for depressed mood. The patient is not nervous/anxious.        Past Medical History:   Diagnosis Date   • Abnormal CT scan, esophagus 03/30/2020   • Herpetic lesions 07/02/2020    Buttocks   • Atrophic vaginitis 07/02/2020   • Dry eye syndrome 07/02/2020   • Female cystocele 07/02/2020   • Anxiety 09/09/2021   • Allergic rhinitis    • Chronic kidney disease (CKD), stage III (moderate)    • Emphysema lung    • GERD (gastroesophageal reflux disease)    • Hematuria    • Hemochromatosis    • Hiatal hernia    • Hyperlipidemia    • IBS (irritable bowel syndrome)    • Mitral regurgitation    • Multiple nodules of lung    • Osteopenia    • Paresthesia of both hands    • Polycythemia    • Recurrent isolated sleep paralysis    • Rosacea    • Seasonal affective disorder    • Sleep apnea        Past Medical History Pertinent Negatives:   Diagnosis Date Noted   • Breast cancer 06/06/2022   • Breast injury 06/06/2022       Past Surgical History:   Procedure Laterality Date   • BREAST BIOPSY Right     Benign   • CATARACT EXTRACTION         Social History     Socioeconomic History   • Marital status:    Tobacco Use   • Smoking status: Never   •  "Smokeless tobacco: Never   Vaping Use   • Vaping Use: Never used   Substance and Sexual Activity   • Alcohol use: No   • Drug use: No   • Sexual activity: Defer       Family History   Problem Relation Age of Onset   • Heart disease Mother    • Diabetes Mother    • Heart disease Father        OBJECTIVE:    Vitals:    04/18/23 1117   BP: 151/83   BP Location: Right arm   Patient Position: Sitting   Cuff Size: Adult   Pulse: 60   Resp: 16   Temp: 97.2 °F (36.2 °C)   TempSrc: Infrared   SpO2: 96%   Weight: 63.5 kg (140 lb)   Height: 160 cm (63\")       Estimated body mass index is 24.8 kg/m² as calculated from the following:    Height as of this encounter: 160 cm (63\").    Weight as of this encounter: 63.5 kg (140 lb).   **  Physical Exam  Vitals and nursing note reviewed.   Constitutional:       General: She is not in acute distress.     Appearance: Normal appearance. She is well-groomed and normal weight. She is not ill-appearing, toxic-appearing or diaphoretic.   HENT:      Head: Normocephalic and atraumatic.   Neck:      Vascular: No carotid bruit.   Cardiovascular:      Rate and Rhythm: Normal rate and regular rhythm.      Pulses: Normal pulses.      Heart sounds: Normal heart sounds. No murmur heard.  Pulmonary:      Effort: Pulmonary effort is normal.      Breath sounds: Normal breath sounds and air entry.   Abdominal:      General: Abdomen is flat. Bowel sounds are normal.      Palpations: Abdomen is soft.      Tenderness: There is no abdominal tenderness.   Skin:     General: Skin is warm and dry.      Capillary Refill: Capillary refill takes less than 2 seconds.   Neurological:      General: No focal deficit present.      Mental Status: She is alert and oriented to person, place, and time. Mental status is at baseline.   Psychiatric:         Attention and Perception: Attention and perception normal.         Mood and Affect: Mood and affect normal.         Speech: Speech normal.         Behavior: Behavior " normal. Behavior is cooperative.         Thought Content: Thought content normal.         Cognition and Memory: Cognition and memory normal.         Judgment: Judgment normal.       Derm Physical Exam    Result Review :                    PHQ-9 Total Score:             ASSESSMENT/ PLAN:    Data Reviewed:   Assessment and Plan      Diagnoses and all orders for this visit:    1. H/O bone density study (Primary)  Comments:  Osteopenia 2020, normal bone density 2022 Dexa result.     2. Stage 3 chronic kidney disease, unspecified whether stage 3a or 3b CKD  -     CBC & Differential  -     Comprehensive Metabolic Panel  -     POC Urinalysis Dipstick, Multipro  -     Vitamin D 25 hydroxy    3. Elevated blood pressure reading  Comments:  Patient waited one hour to be seen.   Advised to check b/p athome.   Orders:  -     CBC & Differential  -     Comprehensive Metabolic Panel    4. Mixed hyperlipidemia  -     Lipid Panel  -     atorvastatin (LIPITOR) 20 MG tablet; Take 1 tablet by mouth Daily.  Dispense: 90 tablet; Refill: 1    5. Encounter for vitamin deficiency screening  -     Vitamin D 25 hydroxy         H/O bone density study [Z92.89]             Wrapup Tab  Follow Up   Requested Prescriptions     Signed Prescriptions Disp Refills   • atorvastatin (LIPITOR) 20 MG tablet 90 tablet 1     Sig: Take 1 tablet by mouth Daily.     Medications Discontinued During This Encounter   Medication Reason   • risedronate (ACTONEL) 150 MG tablet *Therapy completed   • atorvastatin (LIPITOR) 20 MG tablet Reorder     Return in about 1 year (around 4/18/2024) for Medicare Wellness. Next appointment with this provider is Visit date not found.      Patient was given instructions and counseling regarding her condition or for health maintenance advice. Please see specific information pulled into the AVS if appropriate.    Patient Instructions   Continue current plan of care as discussed.   Take medication as ordered (if  applicable).    Practice good sleep hygiene.  Eat a well balanced diet with fresh fruit and vegetables.    Stay hydrated, drink water daily.      Limit sweetened beverages, sodas, juices.    Bake, boil, broil or grill your food, avoid eating fried foods.   Regular exercise is important.  Incorporate weight or resistance into your routine.      Hand hygiene was performed during entrance to exam room and following assessment of patient. This document is intended for medical expert use only.     EMR Dragon/Transcription disclaimer:   Much of this encounter note is an electronic transcription/translation of spoken language to printed text. The electronic translation of spoken language may permit erroneous, or at times, nonsensical words or phrases to be inadvertently transcribed.

## 2023-06-07 ENCOUNTER — HOSPITAL ENCOUNTER (OUTPATIENT)
Dept: MAMMOGRAPHY | Facility: HOSPITAL | Age: 83
Discharge: HOME | End: 2023-06-07
Payer: MEDICARE

## 2023-06-07 DIAGNOSIS — Z12.31 SCREENING MAMMOGRAM FOR BREAST CANCER: ICD-10-CM

## 2023-06-07 PROCEDURE — 77063 BREAST TOMOSYNTHESIS BI: CPT

## 2023-06-07 PROCEDURE — 77067 SCR MAMMO BI INCL CAD: CPT

## 2023-06-07 NOTE — PROGRESS NOTES
Garcia Cooper wanted you to know that your mammogram was normal and routine annual screenings are recommended.     I called and spoke with patient and gave normal results

## 2023-06-08 LAB
25(OH)D3+25(OH)D2 SERPL-MCNC: 38.7 NG/ML (ref 30–100)
ALBUMIN SERPL-MCNC: 4.3 G/DL (ref 3.6–4.6)
ALBUMIN/GLOB SERPL: 2 {RATIO} (ref 1.2–2.2)
ALP SERPL-CCNC: 83 IU/L (ref 44–121)
ALT SERPL-CCNC: 22 IU/L (ref 0–32)
AST SERPL-CCNC: 31 IU/L (ref 0–40)
BASOPHILS # BLD AUTO: 0.1 X10E3/UL (ref 0–0.2)
BASOPHILS NFR BLD AUTO: 1 %
BILIRUB SERPL-MCNC: 0.5 MG/DL (ref 0–1.2)
BUN SERPL-MCNC: 12 MG/DL (ref 8–27)
BUN/CREAT SERPL: 13 (ref 12–28)
CALCIUM SERPL-MCNC: 9.7 MG/DL (ref 8.7–10.3)
CHLORIDE SERPL-SCNC: 103 MMOL/L (ref 96–106)
CHOLEST SERPL-MCNC: 145 MG/DL (ref 100–199)
CO2 SERPL-SCNC: 28 MMOL/L (ref 20–29)
CREAT SERPL-MCNC: 0.93 MG/DL (ref 0.57–1)
EGFRCR SERPLBLD CKD-EPI 2021: 61 ML/MIN/1.73
EOSINOPHIL # BLD AUTO: 0.3 X10E3/UL (ref 0–0.4)
EOSINOPHIL NFR BLD AUTO: 6 %
ERYTHROCYTE [DISTWIDTH] IN BLOOD BY AUTOMATED COUNT: 12.7 % (ref 11.7–15.4)
GLOBULIN SER CALC-MCNC: 2.1 G/DL (ref 1.5–4.5)
GLUCOSE SERPL-MCNC: 92 MG/DL (ref 70–99)
HCT VFR BLD AUTO: 44.9 % (ref 34–46.6)
HDLC SERPL-MCNC: 51 MG/DL
HGB BLD-MCNC: 14.9 G/DL (ref 11.1–15.9)
IMM GRANULOCYTES # BLD AUTO: 0 X10E3/UL (ref 0–0.1)
IMM GRANULOCYTES NFR BLD AUTO: 0 %
LDLC SERPL CALC-MCNC: 70 MG/DL (ref 0–99)
LYMPHOCYTES # BLD AUTO: 1.7 X10E3/UL (ref 0.7–3.1)
LYMPHOCYTES NFR BLD AUTO: 32 %
MCH RBC QN AUTO: 31.7 PG (ref 26.6–33)
MCHC RBC AUTO-ENTMCNC: 33.2 G/DL (ref 31.5–35.7)
MCV RBC AUTO: 96 FL (ref 79–97)
MONOCYTES # BLD AUTO: 0.4 X10E3/UL (ref 0.1–0.9)
MONOCYTES NFR BLD AUTO: 8 %
NEUTROPHILS # BLD AUTO: 2.8 X10E3/UL (ref 1.4–7)
NEUTROPHILS NFR BLD AUTO: 53 %
PLATELET # BLD AUTO: 185 X10E3/UL (ref 150–450)
POTASSIUM SERPL-SCNC: 4.8 MMOL/L (ref 3.5–5.2)
PROT SERPL-MCNC: 6.4 G/DL (ref 6–8.5)
RBC # BLD AUTO: 4.7 X10E6/UL (ref 3.77–5.28)
SODIUM SERPL-SCNC: 142 MMOL/L (ref 134–144)
TRIGL SERPL-MCNC: 138 MG/DL (ref 0–149)
VLDLC SERPL CALC-MCNC: 24 MG/DL (ref 5–40)
WBC # BLD AUTO: 5.3 X10E3/UL (ref 3.4–10.8)

## 2023-12-17 DIAGNOSIS — E78.2 MIXED HYPERLIPIDEMIA: ICD-10-CM

## 2023-12-18 RX ORDER — ATORVASTATIN CALCIUM 20 MG/1
20 TABLET, FILM COATED ORAL DAILY
Qty: 90 TABLET | Refills: 1 | Status: SHIPPED | OUTPATIENT
Start: 2023-12-18

## 2024-01-02 DIAGNOSIS — E78.2 MIXED HYPERLIPIDEMIA: ICD-10-CM

## 2024-01-02 RX ORDER — ATORVASTATIN CALCIUM 20 MG/1
20 TABLET, FILM COATED ORAL DAILY
Qty: 90 TABLET | Refills: 1 | Status: SHIPPED | OUTPATIENT
Start: 2024-01-02